# Patient Record
Sex: FEMALE | Race: BLACK OR AFRICAN AMERICAN | Employment: FULL TIME | URBAN - METROPOLITAN AREA
[De-identification: names, ages, dates, MRNs, and addresses within clinical notes are randomized per-mention and may not be internally consistent; named-entity substitution may affect disease eponyms.]

---

## 2017-03-15 DIAGNOSIS — I10 ESSENTIAL HYPERTENSION: ICD-10-CM

## 2017-03-15 RX ORDER — HYDROCHLOROTHIAZIDE 25 MG/1
TABLET ORAL
Qty: 90 TAB | Refills: 2 | OUTPATIENT
Start: 2017-03-15

## 2017-03-15 NOTE — TELEPHONE ENCOUNTER
Last seen 7/25/2016. Needs OV with me.      Denied:    Requested Prescriptions     Refused Prescriptions Disp Refills    hydroCHLOROthiazide (HYDRODIURIL) 25 mg tablet [Pharmacy Med Name: hydroCHLOROthiazide 25 MG TABLET] 90 Tab 2     Sig: TAKE ONE TABLET BY MOUTH DAILY     Refused By: Dennis Arteaga     Reason for Refusal: Appt required, please call patient

## 2017-03-16 ENCOUNTER — OFFICE VISIT (OUTPATIENT)
Dept: INTERNAL MEDICINE CLINIC | Age: 37
End: 2017-03-16

## 2017-03-16 VITALS
OXYGEN SATURATION: 99 % | HEART RATE: 85 BPM | WEIGHT: 216.2 LBS | TEMPERATURE: 97.1 F | DIASTOLIC BLOOD PRESSURE: 90 MMHG | HEIGHT: 67 IN | BODY MASS INDEX: 33.93 KG/M2 | SYSTOLIC BLOOD PRESSURE: 130 MMHG | RESPIRATION RATE: 16 BRPM

## 2017-03-16 DIAGNOSIS — F32.A ANXIETY AND DEPRESSION: ICD-10-CM

## 2017-03-16 DIAGNOSIS — E55.9 VITAMIN D DEFICIENCY: ICD-10-CM

## 2017-03-16 DIAGNOSIS — F41.9 ANXIETY AND DEPRESSION: ICD-10-CM

## 2017-03-16 DIAGNOSIS — I10 BENIGN HYPERTENSION WITHOUT CHF: Primary | ICD-10-CM

## 2017-03-16 RX ORDER — GLYCOPYRROLATE 1 MG/1
1 TABLET ORAL
COMMUNITY
Start: 2016-11-16

## 2017-03-16 RX ORDER — NORETHINDRONE ACETATE AND ETHINYL ESTRADIOL 1MG-20(21)
KIT ORAL
COMMUNITY
Start: 2017-02-27 | End: 2017-03-16

## 2017-03-16 RX ORDER — CITALOPRAM 20 MG/1
20 TABLET, FILM COATED ORAL DAILY
Qty: 30 TAB | Refills: 3 | Status: SHIPPED | OUTPATIENT
Start: 2017-03-16 | End: 2017-04-20 | Stop reason: DRUGHIGH

## 2017-03-16 RX ORDER — HYDROCHLOROTHIAZIDE 25 MG/1
25 TABLET ORAL DAILY
Qty: 90 TAB | Refills: 3 | Status: SHIPPED | OUTPATIENT
Start: 2017-03-16 | End: 2018-03-18 | Stop reason: SDUPTHER

## 2017-03-16 NOTE — PATIENT INSTRUCTIONS
1) if you did not take the lexapro today can start the celexa. Otherwise, start taking the celexa tomorrow. 2) follow-up in 1 month or sooner if worsening symptoms.

## 2017-03-16 NOTE — MR AVS SNAPSHOT
Visit Information Date & Time Provider Department Dept. Phone Encounter #  
 3/16/2017  7:15 AM Bre Gambino MD [address] [phone] [id number] Follow-up Instructions Return in about 1 month (around 4/16/2017) for f/u anxiety, f/u depression. Upcoming Health Maintenance Date Due  
 PAP AKA CERVICAL CYTOLOGY 10/10/2016 DTaP/Tdap/Td series (2 - Td) 6/26/2024 Allergies as of 3/16/2017  Review Complete On: 3/16/2017 By: Bernardo Arthur MD  
 No Known Allergies Current Immunizations  Reviewed on 7/25/2016 Name Date Tdap 6/26/2014 Not reviewed this visit You Were Diagnosed With   
  
 Codes Comments Benign hypertension without CHF    -  Primary ICD-10-CM: I10 
ICD-9-CM: 401.1 Vitamin D deficiency     ICD-10-CM: E55.9 ICD-9-CM: 268.9 Anxiety and depression     ICD-10-CM: F41.9, F32.9 ICD-9-CM: 300.00, 311 Vitals BP Pulse Temp Resp Height(growth percentile) Weight(growth percentile) (!) 139/98 (BP 1 Location: Right arm, BP Patient Position: Sitting) 85 97.1 °F (36.2 °C) (Oral) 16 5' 7\" (1.702 m) 216 lb 3.2 oz (98.1 kg) LMP SpO2 BMI OB Status Smoking Status 03/02/2017 99% 33.86 kg/m2 Having regular periods Former Smoker Vitals History BMI and BSA Data Body Mass Index Body Surface Area  
 33.86 kg/m 2 2.15 m 2 Preferred Pharmacy Pharmacy Name Phone [facility] [place], [address] [facility] [phone] Your Updated Medication List  
  
   
This list is accurate as of: 3/16/17  7:44 AM.  Always use your most recent med list.  
  
  
  
  
 citalopram 20 mg tablet Commonly known as:  Inga Letters Take 1 Tab by mouth daily. glycopyrrolate 1 mg tablet Commonly known as:  ROBINUL  
1 mg daily as needed. hydroCHLOROthiazide 25 mg tablet Commonly known as:  HYDRODIURIL Take 1 Tab by mouth daily. metroNIDAZOLE 500 mg tablet Commonly known as:  FLAGYL Take 500 mg by mouth.  
  
 phentermine 37.5 mg tablet Commonly known as:  ADIPEX-P Take 1 Tab by mouth every morning. Max Daily Amount: 37.5 mg.  
  
  
  
  
Prescriptions Sent to Pharmacy Refills  
 hydroCHLOROthiazide (HYDRODIURIL) 25 mg tablet 3 Sig: Take 1 Tab by mouth daily. Class: Normal  
 Pharmacy: [address] Ph #: [phone] Route: Oral  
 citalopram (CELEXA) 20 mg tablet 3 Sig: Take 1 Tab by mouth daily. Class: Normal  
 Pharmacy: [address] Ph #: [phone] Route: Oral  
  
Follow-up Instructions Return in about 1 month (around 4/16/2017) for f/u anxiety, f/u depression. To-Do List   
 04/16/2017 Lab:  CBC W/O DIFF   
  
 04/16/2017 Lab:  LIPID PANEL   
  
 04/16/2017 Lab:  METABOLIC PANEL, COMPREHENSIVE   
  
 04/16/2017 Lab:  URINALYSIS W/ RFLX MICROSCOPIC   
  
 04/16/2017 Lab:  VITAMIN D, 25 HYDROXY Patient Instructions 1) if you did not take the lexapro today can start the celexa. Otherwise, start taking the celexa tomorrow. 2) follow-up in 1 month or sooner if worsening symptoms. Introducing [facility]! [facility] introduces [employer] patient portal. Now you can access parts of your medical record, email your doctor's office, and request medication refills online. 1. In your internet browser, go to [email or web address] 2. Click on the First Time User? Click Here link in the Sign In box. You will see the New Member Sign Up page. 3. Enter your [employer] Access Code exactly as it appears below. You will not need to use this code after youve completed the sign-up process. If you do not sign up before the expiration date, you must request a new code. · Find[employer] Access Code: [email or web address] Expires: 6/14/2017  7:44 AM 
 
 4. Enter the last four digits of your Social Security Number (xxxx) and Date of Birth (mm/dd/yyyy) as indicated and click Submit. You will be taken to the next sign-up page. 5. Create a [employer] ID. This will be your [employer] login ID and cannot be changed, so think of one that is secure and easy to remember. 6. Create a [employer] password. You can change your password at any time. 7. Enter your Password Reset Question and Answer. This can be used at a later time if you forget your password. 8. Enter your e-mail address. You will receive e-mail notification when new information is available in [address] 9. Click Sign Up. You can now view and download portions of your medical record. 10. Click the Download Summary menu link to download a portable copy of your medical information. If you have questions, please visit the Frequently Asked Questions section of the [employer] website. Remember, [employer] is NOT to be used for urgent needs. For medical emergencies, dial 911. Now available from your iPhone and Android! Please provide this summary of care documentation to your next provider. Your primary care clinician is listed as Shavon Ovalle. If you have any questions after today's visit, please call [phone].

## 2017-03-16 NOTE — PROGRESS NOTES
ROOM # [id number] presents today for   Chief Complaint   Patient presents with    Medication Refill       Martha Dobbs preferred language for health care discussion is english/other. Is someone accompanying this pt? no    Is the patient using any DME equipment during OV? no    Depression Screening:  PHQ 2 / 9, over the last two weeks 3/16/2017 7/25/2016 7/25/2016 6/29/2016 2/8/2016 11/7/2014   Little interest or pleasure in doing things Several days Not at all Not at all Not at all Not at all Several days   Feeling down, depressed or hopeless Several days Not at all Not at all Not at all Not at all Several days   Total Score PHQ 2 2 0 0 0 0 2       Learning Assessment:  Learning Assessment 3/12/2015   PRIMARY LEARNER Patient   HIGHEST LEVEL OF EDUCATION - PRIMARY LEARNER  SOME COLLEGE   BARRIERS PRIMARY LEARNER NONE   PRIMARY LANGUAGE ENGLISH   LEARNER PREFERENCE PRIMARY DEMONSTRATION   ANSWERED BY Patient   RELATIONSHIP SELF       Abuse Screening:  No flowsheet data found. Fall Risk  No flowsheet data found. Health Maintenance reviewed and discussed per provider. Yes    Martha Dobbs is due for pap smear. Please order/place referral if appropriate. Advance Directive:  1. Do you have an advance directive in place? Patient Reply: no    2. If not, would you like material regarding how to put one in place? Patient Reply: no    Coordination of Care:  1. Have you been to the ER, urgent care clinic since your last visit? Hospitalized since your last visit? no    2. Have you seen or consulted any other health care providers outside of the [address] [place] [address] since your last visit? Include any pap smears or colon screening.  no
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
## 2017-03-21 ENCOUNTER — LAB ONLY (OUTPATIENT)
Dept: INTERNAL MEDICINE CLINIC | Age: 37
End: 2017-03-21

## 2017-03-22 LAB
25(OH)D3 SERPL-MCNC: 23.1 NG/ML (ref 32–100)
A-G RATIO,AGRAT: 1.5 RATIO (ref 1.1–2.6)
ALBUMIN SERPL-MCNC: 4.3 G/DL (ref 3.5–5)
ALP SERPL-CCNC: 68 U/L (ref 25–115)
ALT SERPL-CCNC: 11 U/L (ref 5–40)
ANION GAP SERPL CALC-SCNC: 18 MMOL/L
AST SERPL W P-5'-P-CCNC: 13 U/L (ref 10–37)
BILIRUB SERPL-MCNC: 0.4 MG/DL (ref 0.2–1.2)
BILIRUB UR QL: NEGATIVE
BUN SERPL-MCNC: 9 MG/DL (ref 6–22)
CALCIUM SERPL-MCNC: 9.3 MG/DL (ref 8.4–10.5)
CHLORIDE SERPL-SCNC: 97 MMOL/L (ref 98–110)
CHOLEST SERPL-MCNC: 176 MG/DL (ref 110–200)
CO2 SERPL-SCNC: 24 MMOL/L (ref 20–32)
CREAT SERPL-MCNC: 0.5 MG/DL (ref 0.5–1.2)
EPITHELIAL,EPSU: ABNORMAL /HPF (ref 0–2)
ERYTHROCYTE [DISTWIDTH] IN BLOOD BY AUTOMATED COUNT: 14.9 % (ref 10–16)
GFRAA, 66117: >60
GFRNA, 66118: >60
GLOBULIN,GLOB: 2.9 G/DL (ref 2–4)
GLUCOSE SERPL-MCNC: 102 MG/DL (ref 65–99)
GLUCOSE UR QL: NEGATIVE MG/DL
HCT VFR BLD AUTO: 36.5 % (ref 35.1–46.5)
HDLC SERPL-MCNC: 71 MG/DL (ref 40–59)
HGB BLD-MCNC: 11.1 G/DL (ref 11.7–15.5)
HGB UR QL STRIP: NEGATIVE
KETONES UR QL STRIP.AUTO: NEGATIVE MG/DL
LDLC SERPL CALC-MCNC: 70 MG/DL (ref 50–99)
LEUKOCYTE ESTERASE: ABNORMAL
MCH RBC QN AUTO: 28 PG (ref 26–34)
MCHC RBC AUTO-ENTMCNC: 30 G/DL (ref 32–36)
MCV RBC AUTO: 93 FL (ref 80–95)
NITRITE UR QL STRIP.AUTO: NEGATIVE
PH UR STRIP: 5 PH (ref 5–8)
PLATELET # BLD AUTO: 299 K/UL (ref 140–440)
PMV BLD AUTO: 11 FL (ref 6–10.8)
POTASSIUM SERPL-SCNC: 3.6 MMOL/L (ref 3.5–5.5)
PROT SERPL-MCNC: 7.2 G/DL (ref 6.4–8.3)
PROT UR QL STRIP: NEGATIVE MG/DL
RBC # BLD AUTO: 3.92 M/UL (ref 3.8–5.2)
RBC #/AREA URNS HPF: NEGATIVE /HPF
SODIUM SERPL-SCNC: 139 MMOL/L (ref 133–145)
SP GR UR: 1.02 (ref 1–1.03)
TRIGL SERPL-MCNC: 176 MG/DL (ref 40–149)
UROBILINOGEN UR STRIP-MCNC: <2 MG/DL
VLDLC SERPL CALC-MCNC: 35 MG/DL (ref 8–30)
WBC # BLD AUTO: 6.3 K/UL (ref 4–11)
WBC URNS QL MICRO: ABNORMAL /HPF (ref 0–2)

## 2017-03-24 NOTE — PROGRESS NOTES
Please let pt know that labs were normal except:    1) urine showed some WBC's, but also epithelial cells. So this could be a contaminate. Is she having any f/c/ns,. Dysuria, hematuria, increase urgency/frequency, abd pain, back pain? \    2) vitamin D low. She needs to take vitamin D3 2000 international units  Take one po daily OTC. 3) trig up at 176 and needs to be <150. She needs to work on diet and exercise and start fish oil 1000mg (DHA+EPA=1000mg) one po daily OTC. 4) glucose little up. Work on diet and exercise.

## 2017-03-27 ENCOUNTER — TELEPHONE (OUTPATIENT)
Dept: INTERNAL MEDICINE CLINIC | Age: 37
End: 2017-03-27

## 2017-03-27 NOTE — TELEPHONE ENCOUNTER
----- Message from Hanna Parra MD sent at 3/24/2017  3:03 PM EDT -----  She cannot take the fish oil at the same time as the celexa. See other result note below.

## 2017-03-27 NOTE — TELEPHONE ENCOUNTER
Unsuccessful attempt to reach pt for results. Left message for pt. To call back at her earliest convenience. Notes Recorded by Codie Lowery MD on 3/24/2017 at 2:42 PM  Please let pt know that labs were normal except:    1) urine showed some WBC's, but also epithelial cells. So this could be a contaminate. Is she having any f/c/ns,. Dysuria, hematuria, increase urgency/frequency, abd pain, back pain? \    2) vitamin D low. She needs to take vitamin D3 2000 international units  Take one po daily OTC. 3) trig up at 176 and needs to be <150. She needs to work on diet and exercise and start fish oil 1000mg (DHA+EPA=1000mg) one po daily OTC. 4) glucose little up.  Work on diet and exercise.

## 2017-03-27 NOTE — PROGRESS NOTES
The following result note has been reviewed. Please refer to telephone encounter created for further documentation.

## 2017-03-27 NOTE — TELEPHONE ENCOUNTER
----- Message from Winifred Mccollum MD sent at 3/24/2017  3:03 PM EDT -----  She cannot take the fish oil at the same time as the celexa. See other result note below.

## 2017-03-27 NOTE — LETTER
3/28/2017 9:18 AM 
 
Ms. Kelle Loera [address] [facility] [phone] Dear Kelle Loera: 
 
Please find your most recent results below. Resulted Orders METABOLIC PANEL, COMPREHENSIVE Result Value Ref Range Glucose 102 (H) 65 - 99 mg/dL BUN 9 6 - 22 mg/dL Creatinine 0.5 0.5 - 1.2 mg/dL Sodium 139 133 - 145 mmol/L Potassium 3.6 3.5 - 5.5 mmol/L Chloride 97 (L) 98 - 110 mmol/L  
 CO2 24 20 - 32 mmol/L  
 AST (SGOT) 13 10 - 37 U/L  
 ALT (SGPT) 11 5 - 40 U/L Alk. phosphatase 68 25 - 115 U/L Bilirubin, total 0.4 0.2 - 1.2 mg/dL Calcium 9.3 8.4 - 10.5 mg/dL Protein, total 7.2 6.4 - 8.3 g/dL Albumin 4.3 3.5 - 5.0 g/dL A-G Ratio 1.5 1.1 - 2.6 ratio Globulin 2.9 2.0 - 4.0 g/dL Anion gap 18.0 mmol/L  
 GFRAA >60.0 >60.0 GFRNA >60.0 >60.0 LIPID PANEL Result Value Ref Range Triglyceride 176 (H) 40 - 149 mg/dL HDL Cholesterol 71 (H) 40 - 59 mg/dL Cholesterol, total 176 110 - 200 mg/dL LDL, calculated 70 50 - 99 mg/dL VLDL, calculated 35 (H) 8 - 30 mg/dL CBC W/O DIFF Result Value Ref Range WBC 6.3 4.0 - 11.0 K/uL  
 RBC 3.92 3.80 - 5.20 M/uL  
 HGB 11.1 (L) 11.7 - 15.5 g/dL HCT 36.5 35.1 - 46.5 % MCV 93 80 - 95 fL  
 MCH 28 26 - 34 pg MCHC 30 (L) 32 - 36 g/dL  
 RDW 14.9 10.0 - 16.0 % PLATELET 686 654 - 199 K/uL MPV 11.0 (H) 6.0 - 10.8 fL  
VITAMIN D, 25 HYDROXY Result Value Ref Range VITAMIN D, 25-HYDROXY 23.1 (L) 32.0 - 100.0 ng/mL URINALYSIS W/MICROSCOPIC Result Value Ref Range Specific Gravity 1.021 1.005 - 1.03  
 pH (UA) 5.0 5.0 - 8.0 pH Protein Negative Negative, mg/dL Glucose Negative Negative mg/dL Ketone Negative Negative mg/dL Bilirubin Negative Negative Blood Negative Negative Nitrites Negative Negative Leukocyte Esterase Trace (A) Negative Urobilinogen <2.0 <2.0 mg/dL WBC 5-10 (A) 0 - 2 /hpf  
 RBC Negative Negative, /hpf  Epithelial cells 3-5 (A) 0 - 2 /hpf  
 
 
 
 RECOMMENDATIONS: 
Please let pt know that labs were normal except: 
 
1) urine showed some WBC's, but also epithelial cells. So this could be a contaminate. 2) vitamin D low. She needs to take vitamin D3 2000 international units  Take one po daily OTC. 3) trig up at 176 and needs to be <150. She needs to work on diet and exercise and start fish oil 1000mg (DHA+EPA=1000mg) one po daily OTC. 4) glucose little up. Work on diet and exercise.   
 
5)She cannot take the fish oil at the same time as the celexa. See other result note below. Please call me if you have any questions: [phone] Sincerely, Reese De Jesus M.D.

## 2017-03-28 ENCOUNTER — TELEPHONE (OUTPATIENT)
Dept: INTERNAL MEDICINE CLINIC | Age: 37
End: 2017-03-28

## 2017-03-28 NOTE — TELEPHONE ENCOUNTER
2nd attempted to contact pt at given/listed number, no answer. Lvm informing pt that a letter is being sent after multiple unsuccessful attempts to reach them. Once they have received the letter if they have any further questions or concerns to please feel free to contact our office at [phone].     Be advised

## 2017-04-03 NOTE — TELEPHONE ENCOUNTER
Incoming call from pt 2 pt identifiers confirmed. Informed pt of below she stated understanding no other questions or concerns noted at this time.

## 2017-04-20 ENCOUNTER — OFFICE VISIT (OUTPATIENT)
Dept: INTERNAL MEDICINE CLINIC | Age: 37
End: 2017-04-20

## 2017-04-20 VITALS
TEMPERATURE: 98.2 F | HEART RATE: 112 BPM | BODY MASS INDEX: 33.62 KG/M2 | RESPIRATION RATE: 16 BRPM | HEIGHT: 67 IN | WEIGHT: 214.2 LBS | DIASTOLIC BLOOD PRESSURE: 93 MMHG | SYSTOLIC BLOOD PRESSURE: 132 MMHG | OXYGEN SATURATION: 99 %

## 2017-04-20 DIAGNOSIS — F32.A ANXIETY AND DEPRESSION: Primary | ICD-10-CM

## 2017-04-20 DIAGNOSIS — R82.90 FOUL SMELLING URINE: ICD-10-CM

## 2017-04-20 DIAGNOSIS — N39.0 URINARY TRACT INFECTION WITHOUT HEMATURIA, SITE UNSPECIFIED: ICD-10-CM

## 2017-04-20 DIAGNOSIS — F41.9 ANXIETY AND DEPRESSION: Primary | ICD-10-CM

## 2017-04-20 LAB
BILIRUB UR QL STRIP: NEGATIVE
GLUCOSE UR-MCNC: NEGATIVE MG/DL
KETONES P FAST UR STRIP-MCNC: NEGATIVE MG/DL
PH UR STRIP: 7 [PH] (ref 4.6–8)
PROT UR QL STRIP: NORMAL MG/DL
SP GR UR STRIP: 1.02 (ref 1–1.03)
UA UROBILINOGEN AMB POC: NORMAL (ref 0.2–1)
URINALYSIS CLARITY POC: NORMAL
URINALYSIS COLOR POC: NORMAL
URINE BLOOD POC: NORMAL
URINE LEUKOCYTES POC: NEGATIVE
URINE NITRITES POC: POSITIVE

## 2017-04-20 RX ORDER — CITALOPRAM 40 MG/1
40 TABLET, FILM COATED ORAL DAILY
Qty: 30 TAB | Refills: 3 | Status: SHIPPED | OUTPATIENT
Start: 2017-04-20 | End: 2018-02-06

## 2017-04-20 RX ORDER — SULFAMETHOXAZOLE AND TRIMETHOPRIM 800; 160 MG/1; MG/1
1 TABLET ORAL 2 TIMES DAILY
Qty: 6 TAB | Refills: 0 | Status: SHIPPED | OUTPATIENT
Start: 2017-04-20 | End: 2017-04-23

## 2017-04-20 NOTE — LETTER
NOTIFICATION RETURN TO WORK / SCHOOL 
 
4/20/2017 3:42 PM 
 
Ms. Jelly Mclaughlin [address] [facility] [phone] To Whom It May Concern: 
 
Jelly Mclaughlin is currently under the care of Shayla De León. She will return to work on 4/21/17. If there are questions or concerns please have the patient contact our office. Sincerely, Esteban García MD

## 2017-04-20 NOTE — PROGRESS NOTES
ROOM # [address] presents today for   Chief Complaint   Patient presents with    Anxiety     medication change not helping poss causing depression    Bladder Infection     poss UTI last cx 3/21/17. no burning but strong odor and urinary frequency       Loree Saxena preferred language for health care discussion is english/other. Is someone accompanying this pt? no    Is the patient using any DME equipment during OV? no    Depression Screening:  PHQ 2 / 9, over the last two weeks 4/20/2017 3/16/2017 7/25/2016 7/25/2016 6/29/2016 2/8/2016 11/7/2014   Little interest or pleasure in doing things Several days Several days Not at all Not at all Not at all Not at all Several days   Feeling down, depressed or hopeless Several days Several days Not at all Not at all Not at all Not at all Several days   Total Score PHQ 2 2 2 0 0 0 0 2       Learning Assessment:  Learning Assessment 3/12/2015   PRIMARY LEARNER Patient   HIGHEST LEVEL OF EDUCATION - PRIMARY LEARNER  SOME COLLEGE   BARRIERS PRIMARY LEARNER NONE   PRIMARY LANGUAGE ENGLISH   LEARNER PREFERENCE PRIMARY DEMONSTRATION   ANSWERED BY Patient   RELATIONSHIP SELF       Abuse Screening:  No flowsheet data found. Fall Risk  No flowsheet data found. Health Maintenance reviewed and discussed per provider. Yes    Edward Betancourt is due for pap smear. Please order/place referral if appropriate. Advance Directive:  1. Do you have an advance directive in place? Patient Reply: no    2. If not, would you like material regarding how to put one in place? Patient Reply: no    Coordination of Care:  1. Have you been to the ER, urgent care clinic since your last visit? Hospitalized since your last visit? no    2. Have you seen or consulted any other health care providers outside of the [address] [place] [address] since your last visit? Include any pap smears or colon screening.  no

## 2017-04-20 NOTE — PATIENT INSTRUCTIONS
1) increase the celexa to 40mg daily (take 20mg 2 po daily)    2) increase water    3) follow-up in 1 month or sooner if worsening symptoms. 4) sent new rx for celexa 40mg one po daily. Urinary Tract Infection in Women: Care Instructions  Your Care Instructions    A urinary tract infection, or UTI, is a general term for an infection anywhere between the kidneys and the urethra (where urine comes out). Most UTIs are bladder infections. They often cause pain or burning when you urinate. UTIs are caused by bacteria and can be cured with antibiotics. Be sure to complete your treatment so that the infection goes away. Follow-up care is a key part of your treatment and safety. Be sure to make and go to all appointments, and call your doctor if you are having problems. It's also a good idea to know your test results and keep a list of the medicines you take. How can you care for yourself at home? · Take your antibiotics as directed. Do not stop taking them just because you feel better. You need to take the full course of antibiotics. · Drink extra water and other fluids for the next day or two. This may help wash out the bacteria that are causing the infection. (If you have kidney, heart, or liver disease and have to limit fluids, talk with your doctor before you increase your fluid intake.)  · Avoid drinks that are carbonated or have caffeine. They can irritate the bladder. · Urinate often. Try to empty your bladder each time. · To relieve pain, take a hot bath or lay a heating pad set on low over your lower belly or genital area. Never go to sleep with a heating pad in place. To prevent UTIs  · Drink plenty of water each day. This helps you urinate often, which clears bacteria from your system. (If you have kidney, heart, or liver disease and have to limit fluids, talk with your doctor before you increase your fluid intake.)  · Urinate when you need to. · Urinate right after you have sex.   · Change sanitary pads often. · Avoid douches, bubble baths, feminine hygiene sprays, and other feminine hygiene products that have deodorants. · After going to the bathroom, wipe from front to back. When should you call for help? Call your doctor now or seek immediate medical care if:  · Symptoms such as fever, chills, nausea, or vomiting get worse or appear for the first time. · You have new pain in your back just below your rib cage. This is called flank pain. · There is new blood or pus in your urine. · You have any problems with your antibiotic medicine. Watch closely for changes in your health, and be sure to contact your doctor if:  · You are not getting better after taking an antibiotic for 2 days. · Your symptoms go away but then come back. Where can you learn more? Go to http://jacky-mayra.info/. Enter M731 in the search box to learn more about \"Urinary Tract Infection in Women: Care Instructions. \"  Current as of: November 28, 2016  Content Version: 11.2  © 6886-6707 [employer]. Care instructions adapted under license by [employer] (which disclaims liability or warranty for this information). If you have questions about a medical condition or this instruction, always ask your healthcare professional. [address] any warranty or liability for your use of this information.
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
## 2017-04-22 LAB — CULTURE RESULT, SENTARA: ABNORMAL

## 2017-04-24 ENCOUNTER — TELEPHONE (OUTPATIENT)
Dept: INTERNAL MEDICINE CLINIC | Age: 37
End: 2017-04-24

## 2017-04-24 DIAGNOSIS — N39.0 CHRONIC UTI: Primary | ICD-10-CM

## 2017-04-24 DIAGNOSIS — N39.0 URINARY TRACT INFECTION WITHOUT HEMATURIA, SITE UNSPECIFIED: Primary | ICD-10-CM

## 2017-04-24 RX ORDER — CEFUROXIME AXETIL 500 MG/1
500 TABLET ORAL 2 TIMES DAILY
Qty: 20 TAB | Refills: 0 | Status: SHIPPED | OUTPATIENT
Start: 2017-04-24 | End: 2017-05-04

## 2017-04-24 NOTE — TELEPHONE ENCOUNTER
Referral generated for urology.  The closet urologist that could find is in [address] [place] [address].

## 2017-04-24 NOTE — TELEPHONE ENCOUNTER
2 pt. Identifiers confirmed. Pt. Notified of below. Pt. States she has had recurrent UTI's including one 6 mos ago and one before that. Per Dr. Kern Speaker will refer to Urology. Pt. In agreement c referral to [facility]. No other questions/concerns at this time.

## 2017-04-24 NOTE — TELEPHONE ENCOUNTER
Called pt. To  notify of below. She states that she would rather go to [facility] if there is not closer alternative. No other questions/concerns at this time.

## 2017-04-24 NOTE — TELEPHONE ENCOUNTER
Unsuccessful attempt to reach pt for results. Left message for pt. To call back at her earliest convenience.

## 2017-04-24 NOTE — PROGRESS NOTES
1) Please let pt know that urine ctx positive for E. Coli, although it is resistant to bactrim. 2) Will send electronically: cerfuroxime 500mg one po bid x 10 days #20 no refills. The bacteria is susceptible to this.

## 2017-04-24 NOTE — TELEPHONE ENCOUNTER
----- Message from Jennifre Phan MD sent at 4/24/2017  7:57 AM EDT -----  1) Please let pt know that urine ctx positive for E. Coli, although it is resistant to bactrim. 2) Will send electronically: cerfuroxime 500mg one po bid x 10 days #20 no refills. The bacteria is susceptible to this.

## 2017-04-25 NOTE — TELEPHONE ENCOUNTER
2 pt. Identifiers confirmed. Pt. States she will need the referral for insurance purposes, but she will call and make her own aptmt. No other questions/concerns at this time.

## 2017-04-25 NOTE — TELEPHONE ENCOUNTER
If she wants at urology [facility]. Do I need to do a referral and will she make her own appt?

## 2017-05-03 DIAGNOSIS — N39.0 URINARY TRACT INFECTION WITHOUT HEMATURIA, SITE UNSPECIFIED: Primary | ICD-10-CM

## 2017-05-03 RX ORDER — NITROFURANTOIN 25; 75 MG/1; MG/1
100 CAPSULE ORAL 2 TIMES DAILY
Qty: 14 CAP | Refills: 0 | Status: SHIPPED | OUTPATIENT
Start: 2017-05-03 | End: 2017-05-23 | Stop reason: ALTCHOICE

## 2017-05-03 NOTE — TELEPHONE ENCOUNTER
Sent electronically:    Requested Prescriptions     Signed Prescriptions Disp Refills    nitrofurantoin, macrocrystal-monohydrate, (MACROBID) 100 mg capsule 14 Cap 0     Sig: Take 1 Cap by mouth two (2) times a day. Authorizing Provider: Tim Lindsey     Order in [facility] for urine and urine ctx.

## 2017-05-03 NOTE — TELEPHONE ENCOUNTER
Has she been taking the cefuroxime with food? Also, if she got ceftriaxone she would need to get every day for at least 3-7 days. The ceftriaxone is in the same family as the cefuroxime. The other antibiotics this is susceptible to are IV ones. There is one oral antibiotic that has intermediate susceptibility. Meaning it may or may not treat it. This is macrobid 100mg one po bid x 7 day. We might be able to do this and check urine/urine ctx after treatment to make sure cleared.

## 2017-05-03 NOTE — TELEPHONE ENCOUNTER
Patient called in verified with two identifiers, she stated that when she started her Ceftin on 4/25/17 she took the first two tablets and it gave her an upset stomach so then she just took 1 tablet daily till yesterday (5/2/17) she decided to take the two tablets as directed and she ended up with diarrhea and vomiting. She wanted to know is there something else to take or can she just get the one time shot? Advised that I would talk with provider and get back with her patient verbalized understanding.

## 2017-05-03 NOTE — TELEPHONE ENCOUNTER
Called patient and verified with two identifiers to find out if she has been taking her ceftin with food and she has been. Advised that per Dr. Edith Marie the only other treatments would be:  Also, if she got ceftriaxone she would need to get every day for at least 3-7 days. The ceftriaxone is in the same family as the cefuroxime.      The other antibiotics this is susceptible to are IV ones. There is one oral antibiotic that has intermediate susceptibility. Meaning it may or may not treat it. This is macrobid 100mg one po bid x 7 day. We might be able to do this and check urine/urien ctx after treatment to make sure cleared. Patient stated she would go for the [facility] and come back in 7 days to be checked again that she will call and make that appointment tomorrow. Advised that I will forward this to Dr. Edith Marie to send the Macrobid to her pharmacy and she can pick it up later on this evening. Patient verbalized understanding.

## 2017-05-09 ENCOUNTER — OFFICE VISIT (OUTPATIENT)
Dept: INTERNAL MEDICINE CLINIC | Age: 37
End: 2017-05-09

## 2017-05-09 VITALS
WEIGHT: 212 LBS | HEART RATE: 100 BPM | TEMPERATURE: 97.2 F | BODY MASS INDEX: 33.27 KG/M2 | RESPIRATION RATE: 16 BRPM | SYSTOLIC BLOOD PRESSURE: 160 MMHG | DIASTOLIC BLOOD PRESSURE: 80 MMHG | HEIGHT: 67 IN | OXYGEN SATURATION: 98 %

## 2017-05-09 DIAGNOSIS — I10 BENIGN HYPERTENSION WITHOUT CHF: Primary | ICD-10-CM

## 2017-05-09 DIAGNOSIS — F41.9 ANXIETY: ICD-10-CM

## 2017-05-09 DIAGNOSIS — R82.90 FOUL SMELLING URINE: ICD-10-CM

## 2017-05-09 RX ORDER — AMLODIPINE BESYLATE 5 MG/1
5 TABLET ORAL DAILY
Qty: 30 TAB | Refills: 3 | Status: SHIPPED | OUTPATIENT
Start: 2017-05-09 | End: 2018-07-05 | Stop reason: SDUPTHER
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
## 2017-05-09 NOTE — MR AVS SNAPSHOT
Visit Information Date & Time Provider Department Dept. Phone Encounter #  
 5/9/2017  3:30 PM Reeda Goldberg Cloyce Silvers, MD [facility] [phone] [id number] Follow-up Instructions Return in about 2 weeks (around 5/23/2017) for f/u HTN. Your Appointments 5/22/2017  3:30 PM  
Office Visit with Walt Self MD  
[id number]) Appt Note: 1 month f/u depression [address]  
  
   
 [id number] [place] [address]  
  
    
  
 6/13/2017 11:15 AM  
Any with Octavio Adame MD  
[facility] [place] [id number]) Appt Note: NP Rec UTI  ref by Kirsten Hui [phone]  
 [address] [place] [address]  
[phone]  
  
   
 [place] [id number] Upcoming Health Maintenance Date Due  
 PAP AKA CERVICAL CYTOLOGY 10/10/2016 INFLUENZA AGE 9 TO ADULT 8/1/2017 DTaP/Tdap/Td series (2 - Td) 6/26/2024 Allergies as of 5/9/2017  Review Complete On: 5/9/2017 By: Walt Self MD  
  
 Severity Noted Reaction Type Reactions Ceftin [Cefuroxime Axetil]  05/09/2017    Nausea and Vomiting Current Immunizations  Reviewed on 7/25/2016 Name Date Tdap 6/26/2014 Not reviewed this visit You Were Diagnosed With   
  
 Codes Comments Benign hypertension without CHF    -  Primary ICD-10-CM: I10 
ICD-9-CM: 401.1 Anxiety     ICD-10-CM: F41.9 ICD-9-CM: 300.00 Foul smelling urine     ICD-10-CM: R82.90 ICD-9-CM: 791.9 Vitals BP Pulse Temp Resp Height(growth percentile) Weight(growth percentile) (!) 162/109 (BP 1 Location: Right arm, BP Patient Position: Sitting) 100 97.2 °F (36.2 °C) (Oral) 16 5' 7\" (1.702 m) 212 lb (96.2 kg) LMP SpO2 BMI OB Status Smoking Status 04/06/2017 98% 33.2 kg/m2 Having regular periods Former Smoker Vitals History BMI and BSA Data Body Mass Index Body Surface Area  
 33.2 kg/m 2 2.13 m 2 Preferred Pharmacy Pharmacy Name Phone [facility], [address] [address] [facility] [address] [place] [address] [phone] Your Updated Medication List  
  
   
This list is accurate as of: 5/9/17  4:00 PM.  Always use your most recent med list. amLODIPine 5 mg tablet Commonly known as:  Houston Arnt Take 1 Tab by mouth daily. citalopram 40 mg tablet Commonly known as:  Nevelyn Poke Take 1 Tab by mouth daily. glycopyrrolate 1 mg tablet Commonly known as:  ROBINUL  
1 mg daily as needed. hydroCHLOROthiazide 25 mg tablet Commonly known as:  HYDRODIURIL Take 1 Tab by mouth daily. nitrofurantoin (macrocrystal-monohydrate) 100 mg capsule Commonly known as:  MACROBID Take 1 Cap by mouth two (2) times a day. phentermine 37.5 mg tablet Commonly known as:  ADIPEX-P Take 1 Tab by mouth every morning. Max Daily Amount: 37.5 mg.  
  
  
  
  
Prescriptions Sent to Pharmacy Refills  
 amLODIPine (NORVASC) 5 mg tablet 3 Sig: Take 1 Tab by mouth daily. Class: Normal  
 Pharmacy: [facility], [address] [facility] #: [phone] Route: Oral  
  
Follow-up Instructions Return in about 2 weeks (around 5/23/2017) for f/u HTN. To-Do List   
 05/09/2017 Microbiology:  CULTURE, URINE   
  
 05/09/2017 Lab:  URINALYSIS W/ RFLX MICROSCOPIC Patient Instructions 1) follow-up in 2 weeks or sooner if worsening symptoms. 2) keep an eye on your blood pressure and let us know if too high or too low. Introducing [facility]! [address] introduces [email or web address] patient portal. Now you can access parts of your medical record, email your doctor's office, and request medication refills online. 1. In your internet browser, go to [email or web address] 2. Click on the First Time User? Click Here link in the Sign In box. You will see the New Member Sign Up page. 3. Enter your TapInko Access Code exactly as it appears below. You will not need to use this code after youve completed the sign-up process. If you do not sign up before the expiration date, you must request a new code. · TapInko Access Code: [email or web address] Expires: 6/14/2017  7:44 AM 
 
4. Enter the last four digits of your Social Security Number (xxxx) and Date of Birth (mm/dd/yyyy) as indicated and click Submit. You will be taken to the next sign-up page. 5. Create a TapInko ID. This will be your TapInko login ID and cannot be changed, so think of one that is secure and easy to remember. 6. Create a TapInko password. You can change your password at any time. 7. Enter your Password Reset Question and Answer. This can be used at a later time if you forget your password. 8. Enter your e-mail address. You will receive e-mail notification when new information is available in [address] 9. Click Sign Up. You can now view and download portions of your medical record. 10. Click the Download Summary menu link to download a portable copy of your medical information. If you have questions, please visit the Frequently Asked Questions section of the TapInko website. Remember, TapInko is NOT to be used for urgent needs. For medical emergencies, dial 911. Now available from your iPhone and Android! Please provide this summary of care documentation to your next provider. Your primary care clinician is listed as Bravo Owen. If you have any questions after today's visit, please call [phone].

## 2017-05-09 NOTE — LETTER
5/9/2017 3:58 PM 
 
Ms. Sakina Michel [address] [facility] [address] [id number] To Whom It May Concern: This is a letter stating that Ms. Juan Reid did in fact call us regarding her medical issues on 5/3/17. If you have any questions, please have the patient contact us at [phone]. Sincerely, Yann Wesley M.D.

## 2017-05-09 NOTE — PATIENT INSTRUCTIONS
1) follow-up in 2 weeks or sooner if worsening symptoms. 2) keep an eye on your blood pressure and let us know if too high or too low.

## 2017-05-09 NOTE — PROGRESS NOTES
ROOM # 2    Leeann Frye presents today for   Chief Complaint   Patient presents with    Nausea    Vaginal Bleeding     spotting for one day only       Leeann Frye preferred language for health care discussion is english/other. Is someone accompanying this pt? no    Is the patient using any DME equipment during OV? no    Depression Screening:  PHQ over the last two weeks 5/9/2017 4/20/2017 3/16/2017 7/25/2016 7/25/2016 6/29/2016 2/8/2016   Little interest or pleasure in doing things Not at all Several days Several days Not at all Not at all Not at all Not at all   Feeling down, depressed or hopeless Not at all Several days Several days Not at all Not at all Not at all Not at all   Total Score PHQ 2 0 2 2 0 0 0 0       Learning Assessment:  Learning Assessment 3/12/2015   PRIMARY LEARNER Patient   HIGHEST LEVEL OF EDUCATION - PRIMARY LEARNER  SOME COLLEGE   BARRIERS PRIMARY LEARNER NONE   PRIMARY LANGUAGE ENGLISH   LEARNER PREFERENCE PRIMARY DEMONSTRATION   ANSWERED BY Patient   RELATIONSHIP SELF       Abuse Screening:  No flowsheet data found. Fall Risk  No flowsheet data found. Health Maintenance reviewed and discussed per provider. Yes    Leeann Frye is due for pap smear. Please order/place referral if appropriate. Advance Directive:  1. Do you have an advance directive in place? Patient Reply: no    2. If not, would you like material regarding how to put one in place? Patient Reply: no    Coordination of Care:  1. Have you been to the ER, urgent care clinic since your last visit? Hospitalized since your last visit? no    2. Have you seen or consulted any other health care providers outside of the [facility] since your last visit? Include any pap smears or colon screening.  no

## 2017-05-09 NOTE — LETTER
NOTIFICATION RETURN TO WORK / SCHOOL 
 
5/9/2017 3:57 PM 
 
Ms. Dallas Cartwright [address] [facility] [address] [id number] To Whom It May Concern: 
 
Dallas Cartwright is currently under the care of Shayla De León. She will return to work on 5/10/17. If there are questions or concerns please have the patient contact our office. Sincerely, Tessie Almeida MD

## 2017-05-10 ENCOUNTER — TELEPHONE (OUTPATIENT)
Dept: INTERNAL MEDICINE CLINIC | Age: 37
End: 2017-05-10

## 2017-05-10 NOTE — TELEPHONE ENCOUNTER
Pt contacted at home number. 2 pt identifiers confirmed. Pt states she faxed over information this morning regarding a previous appointment. Pt states she did received a letter on yesterday but her job is requiring more information. Pt is asking that that form be filled out and faxed back at your earliest convenience. Dr Saravanan Mac please be advised.

## 2017-05-11 LAB
BILIRUB UR QL: NEGATIVE
CULTURE RESULT, SENTARA: ABNORMAL
EPITHELIAL,EPSU: ABNORMAL /HPF (ref 0–2)
GLUCOSE UR QL: NEGATIVE MG/DL
HGB UR QL STRIP: ABNORMAL
KETONES UR QL STRIP.AUTO: ABNORMAL MG/DL
LEUKOCYTE ESTERASE: ABNORMAL
NITRITE UR QL STRIP.AUTO: POSITIVE
PH UR STRIP: 6 PH (ref 5–8)
PROT UR QL STRIP: NEGATIVE MG/DL
RBC #/AREA URNS HPF: ABNORMAL /HPF
SP GR UR: 1.02 (ref 1–1.03)
UROBILINOGEN UR STRIP-MCNC: <2 MG/DL
WBC URNS QL MICRO: ABNORMAL /HPF (ref 0–2)

## 2017-05-12 NOTE — PROGRESS NOTES
1) Please let pt know that urine did show UTI and resistant to so many antibiotics including the macrobid. 2) The only option is since she could not tolerate the cefuroxime, but maybe she can tolerate oral keflex. Is she willing to try the keflex? 3) Otherwise the other option is IM rocephin.

## 2017-05-16 ENCOUNTER — TELEPHONE (OUTPATIENT)
Dept: INTERNAL MEDICINE CLINIC | Age: 37
End: 2017-05-16

## 2017-05-16 NOTE — TELEPHONE ENCOUNTER
----- Message from Anya Mancini MD sent at 5/12/2017  3:36 PM EDT -----  See message below. Oral keflex is in the same family as the cefuroxime.

## 2017-05-16 NOTE — TELEPHONE ENCOUNTER
2 pt. Identifiers confirmed. Pt. Notified of below. She states she would rather have the IM injection. She plans to come in on Thursday (nurse visit), but would require an excuse note for work. No other questions/concerns at this time. See message below. Oral keflex is in the same family as the cefuroxime. 1) Please let pt know that urine did show UTI and resistant to so many antibiotics including the macrobid. 2) The only option is since she could not tolerate the cefuroxime, but maybe she can tolerate oral keflex. Is she willing to try the keflex?     3) Otherwise the other option is IM rocephin

## 2017-05-18 ENCOUNTER — CLINICAL SUPPORT (OUTPATIENT)
Dept: INTERNAL MEDICINE CLINIC | Age: 37
End: 2017-05-18

## 2017-05-18 DIAGNOSIS — N39.0 URINARY TRACT INFECTION WITHOUT HEMATURIA, SITE UNSPECIFIED: Primary | ICD-10-CM

## 2017-05-18 RX ORDER — CEFTRIAXONE 1 G/1
1 INJECTION, POWDER, FOR SOLUTION INTRAMUSCULAR; INTRAVENOUS ONCE
Qty: 1 VIAL | Refills: 0
Start: 2017-05-18 | End: 2017-05-18

## 2017-05-18 NOTE — LETTER
NOTIFICATION RETURN TO WORK / SCHOOL 
 
5/18/2017 5:24 PM 
 
Ms. Marilee Davenport [address] [facility] [address] [phone] To Whom It May Concern: 
 
Marilee Davenport is currently under the care of Shayla De León. She will return to work on 5/19/17. Patient was seen in the office today, 5/18/17. If there are questions or concerns please have the patient contact our office. Sincerely, Ramiro Gracia MD

## 2017-05-18 NOTE — PROGRESS NOTES
After obtaining consent, and per orders of Dr. Ar Prescott, injection of 1g Rocefin given by Chau Branch LPN. Patient instructed to remain in clinic for 20 minutes afterwards, and to report any adverse reaction to me immediately. No adverse reactions noted, pt tolerated well.

## 2017-05-23 ENCOUNTER — OFFICE VISIT (OUTPATIENT)
Dept: INTERNAL MEDICINE CLINIC | Age: 37
End: 2017-05-23

## 2017-05-23 VITALS
WEIGHT: 214.6 LBS | SYSTOLIC BLOOD PRESSURE: 122 MMHG | HEIGHT: 67 IN | BODY MASS INDEX: 33.68 KG/M2 | RESPIRATION RATE: 16 BRPM | TEMPERATURE: 97.7 F | OXYGEN SATURATION: 99 % | HEART RATE: 89 BPM | DIASTOLIC BLOOD PRESSURE: 80 MMHG

## 2017-05-23 DIAGNOSIS — N39.0 URINARY TRACT INFECTION WITHOUT HEMATURIA, SITE UNSPECIFIED: ICD-10-CM

## 2017-05-23 DIAGNOSIS — N92.6 ABNORMAL MENSES: ICD-10-CM

## 2017-05-23 DIAGNOSIS — F32.A DEPRESSION, UNSPECIFIED DEPRESSION TYPE: Primary | ICD-10-CM
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
## 2017-05-23 NOTE — MR AVS SNAPSHOT
Visit Information Date & Time Provider Department Dept. Phone Encounter #  
 5/23/2017  3:45 PM Roland Mcqueen MD [employer] [phone] [id number] Follow-up Instructions Return in about 3 months (around 8/23/2017) for f/u HTN, f/u depression. 6/13/2017 11:15 AM  
Any with Rachana Laboy MD  
[facility] [place]t Note: NP Rec UTI  ref by [employer] [phone]  
 [address] [place] [address]  
[phone]  
  
   
 [place] [id number] Upcoming Health Maintenance Date Due  
 PAP AKA CERVICAL CYTOLOGY 10/10/2016 INFLUENZA AGE 9 TO ADULT 8/1/2017 DTaP/Tdap/Td series (2 - Td) 6/26/2024 Allergies as of 5/23/2017  Review Complete On: 5/23/2017 By: Christopher Pineda MD  
  
 Severity Noted Reaction Type Reactions Ceftin [Cefuroxime Axetil]  05/09/2017    Nausea and Vomiting She can tolerate ceftriaxone. Current Immunizations  Reviewed on 7/25/2016 Name Date Tdap 6/26/2014 Not reviewed this visit You Were Diagnosed With   
  
 Codes Comments Depression, unspecified depression type    -  Primary ICD-10-CM: F32.9 ICD-9-CM: 057 Urinary tract infection without hematuria, site unspecified     ICD-10-CM: N39.0 ICD-9-CM: 599.0 Abnormal menses     ICD-10-CM: N92.6 ICD-9-CM: 626.9 Vitals BP Pulse Temp Resp Height(growth percentile) Weight(growth percentile) 122/80 (BP 1 Location: Left arm, BP Patient Position: Sitting) 89 97.7 °F (36.5 °C) (Oral) 16 5' 7\" (1.702 m) 214 lb 9.6 oz (97.3 kg) LMP SpO2 BMI OB Status Smoking Status 04/25/2017 99% 33.61 kg/m2 Having regular periods Former Smoker Vitals History BMI and BSA Data Body Mass Index Body Surface Area  
 33.61 kg/m 2 2.14 m 2 [facility] [place] [address] [facility] [address] [place] [address] [phone] Your Updated Medication List  
  
   
This list is accurate as of: 5/23/17  4:37 PM.  Always use your most recent med list. amLODIPine 5 mg tablet Commonly known as:  Sameamber Mina Take 1 Tab by mouth daily. citalopram 40 mg tablet Commonly known as:  Oz Loop Take 1 Tab by mouth daily. glycopyrrolate 1 mg tablet Commonly known as:  ROBINUL  
1 mg daily as needed. hydroCHLOROthiazide 25 mg tablet Commonly known as:  HYDRODIURIL Take 1 Tab by mouth daily. OTHER Indications: unspecified birth control pill.  
  
 phentermine 37.5 mg tablet Commonly known as:  ADIPEX-P Take 1 Tab by mouth every morning. Max Daily Amount: 37.5 mg. Follow-up Instructions Return in about 3 months (around 8/23/2017) for f/u HTN, f/u depression. To-Do List   
 05/23/2017 Microbiology:  CULTURE, URINE   
  
 05/23/2017 Lab:  URINALYSIS W/ RFLX MICROSCOPIC Patient Instructions 1) follow-up in 3 months or sooner if worsening symptoms. Introducing [facility] & HEALTH SERVICES! [facility] introduces [employer] patient portal. Now you can access parts of your medical record, email your doctor's office, and request medication refills online. 1. In your internet browser, go to [email or web address] 2. Click on the First Time User? Click Here link in the Sign In box. You will see the New Member Sign Up page. 3. Enter your [employer] Access Code exactly as it appears below. You will not need to use this code after youve completed the sign-up process. If you do not sign up before the expiration date, you must request a new code. · [employer] Access Code: [email or web address] Expires: 6/14/2017  7:44 AM 
 
4. Enter the last four digits of your Social Security Number (xxxx) and Date of Birth (mm/dd/yyyy) as indicated and click Submit.  You will be taken to the next sign-up page. 5. Create a [employer] ID. This will be your [employer] login ID and cannot be changed, so think of one that is secure and easy to remember. 6. Create a [employer] password. You can change your password at any time. 7. Enter your Password Reset Question and Answer. This can be used at a later time if you forget your password. 8. Enter your e-mail address. You will receive e-mail notification when new information is available in [address] 9. Click Sign Up. You can now view and download portions of your medical record. 10. Click the Download Summary menu link to download a portable copy of your medical information. If you have questions, please visit the Frequently Asked Questions section of the [employer] website. Remember, [employer] is NOT to be used for urgent needs. For medical emergencies, dial 911. Now available from your iPhone and Android! Please provide this summary of care documentation to your next provider. Your primary care clinician is listed as Tigist Bowser. If you have any questions after today's visit, please call [phone].

## 2017-05-23 NOTE — PROGRESS NOTES
ROOM # 3    Yanira Field presents today for   Chief Complaint   Patient presents with    Depression       Yanira Field preferred language for health care discussion is english/other. Is someone accompanying this pt? no    Is the patient using any DME equipment during OV? no    Depression Screening:  PHQ over the last two weeks 5/23/2017 5/9/2017 4/20/2017 3/16/2017 7/25/2016 7/25/2016 6/29/2016   Little interest or pleasure in doing things Several days Not at all Several days Several days Not at all Not at all Not at all   Feeling down, depressed or hopeless Several days Not at all Several days Several days Not at all Not at all Not at all   Total Score PHQ 2 2 0 2 2 0 0 0       Learning Assessment:  Learning Assessment 3/12/2015   PRIMARY LEARNER Patient   HIGHEST LEVEL OF EDUCATION - PRIMARY LEARNER  SOME COLLEGE   BARRIERS PRIMARY LEARNER NONE   PRIMARY LANGUAGE ENGLISH   LEARNER PREFERENCE PRIMARY DEMONSTRATION   ANSWERED BY Patient   RELATIONSHIP SELF       Abuse Screening:  No flowsheet data found. Fall Risk  No flowsheet data found. Health Maintenance reviewed and discussed per provider. Yes    Yanira Field is due for pap smear. Please order/place referral if appropriate. Advance Directive:  1. Do you have an advance directive in place? Patient Reply: no    2. If not, would you like material regarding how to put one in place? Patient Reply: no    Coordination of Care:  1. Have you been to the ER, urgent care clinic since your last visit? Hospitalized since your last visit? no    2. Have you seen or consulted any other health care providers outside of the [address] [place] [address] since your last visit? Include any pap smears or colon screening.  no

## 2017-05-23 NOTE — LETTER
NOTIFICATION RETURN TO WORK / SCHOOL 
 
5/23/2017 4:36 PM 
 
Ms. Lee Ang [address] [facility] [address] [phone] To Whom It May Concern: 
 
Lee Ang is currently under the care of Shayla De León. She will return to work on 5/24/17 and she was seen in our office today, 5/23/17. If there are questions or concerns please have the patient contact our office. Sincerely, Parth Hinds MD

## 2017-05-25 LAB
BILIRUB UR QL: NEGATIVE
CULTURE RESULT, SENTARA: NORMAL
EPITHELIAL,EPSU: ABNORMAL /HPF (ref 0–2)
GLUCOSE UR QL: NEGATIVE MG/DL
HGB UR QL STRIP: NEGATIVE
KETONES UR QL STRIP.AUTO: ABNORMAL MG/DL
LEUKOCYTE ESTERASE: NEGATIVE
NITRITE UR QL STRIP.AUTO: NEGATIVE
PH UR STRIP: 5 PH (ref 5–8)
PROT UR QL STRIP: ABNORMAL MG/DL
RBC #/AREA URNS HPF: ABNORMAL /HPF
SP GR UR: 1.03 (ref 1–1.03)
UROBILINOGEN UR STRIP-MCNC: 2 MG/DL
WBC URNS QL MICRO: ABNORMAL /HPF (ref 0–2)

## 2017-06-14 PROBLEM — N30.90 BLADDER INFECTION: Status: ACTIVE | Noted: 2017-06-14

## 2017-07-08 PROBLEM — N39.0 RECURRENT UTI: Status: ACTIVE | Noted: 2017-07-08

## 2017-08-28 ENCOUNTER — OFFICE VISIT (OUTPATIENT)
Dept: INTERNAL MEDICINE CLINIC | Age: 37
End: 2017-08-28

## 2017-08-28 VITALS
WEIGHT: 212 LBS | SYSTOLIC BLOOD PRESSURE: 148 MMHG | DIASTOLIC BLOOD PRESSURE: 96 MMHG | TEMPERATURE: 97.4 F | OXYGEN SATURATION: 100 % | HEART RATE: 82 BPM | HEIGHT: 67 IN | BODY MASS INDEX: 33.27 KG/M2 | RESPIRATION RATE: 18 BRPM

## 2017-08-28 DIAGNOSIS — I10 ESSENTIAL HYPERTENSION: Primary | ICD-10-CM

## 2017-08-28 RX ORDER — BUPROPION HYDROCHLORIDE 150 MG/1
150 TABLET, EXTENDED RELEASE ORAL 2 TIMES DAILY
COMMUNITY
End: 2019-12-09 | Stop reason: SDUPTHER

## 2017-08-28 RX ORDER — AMLODIPINE BESYLATE 2.5 MG/1
2.5 TABLET ORAL DAILY
Qty: 30 TAB | Refills: 2 | Status: SHIPPED | OUTPATIENT
Start: 2017-08-28 | End: 2017-11-26

## 2017-08-28 NOTE — PROGRESS NOTES
ROOM # 9    Katia Desai presents today for   Chief Complaint   Patient presents with    Hypertension     pt present to clinic today for follow-up for her blood pressure       Loree Garcia preferred language for health care discussion is english/other. Is someone accompanying this pt? no    Is the patient using any DME equipment during OV? no    Depression Screening:  PHQ over the last two weeks 5/23/2017 5/9/2017 4/20/2017 3/16/2017 7/25/2016 7/25/2016 6/29/2016   Little interest or pleasure in doing things Several days Not at all Several days Several days Not at all Not at all Not at all   Feeling down, depressed or hopeless Several days Not at all Several days Several days Not at all Not at all Not at all   Total Score PHQ 2 2 0 2 2 0 0 0       Learning Assessment:  Learning Assessment 3/12/2015   PRIMARY LEARNER Patient   HIGHEST LEVEL OF EDUCATION - PRIMARY LEARNER  SOME COLLEGE   BARRIERS PRIMARY LEARNER NONE   PRIMARY LANGUAGE ENGLISH   LEARNER PREFERENCE PRIMARY DEMONSTRATION   ANSWERED BY Patient   RELATIONSHIP SELF       Abuse Screening:  No flowsheet data found. Fall Risk  No flowsheet data found. Health Maintenance reviewed and discussed per provider. Yes    Katia Desai is due for influenza. Please order/place referral if appropriate. Advance Directive:  1. Do you have an advance directive in place? Patient Reply: no    2. If not, would you like material regarding how to put one in place? Patient Reply: no    Coordination of Care:  1. Have you been to the ER, urgent care clinic since your last visit? Hospitalized since your last visit? no    2. Have you seen or consulted any other health care providers outside of the [address] [place] [address] since your last visit? Include any pap smears or colon screening.  no

## 2017-08-28 NOTE — MR AVS SNAPSHOT
Visit Information Date & Time Provider Department Dept. Phone Encounter #  
 8/28/2017  4:15 PM Magda Landin NP [address] [phone] [id number] Your Appointments 8/31/2017  1:15 PM  
ESTABLISHED PATIENT with Julio Cesar Mccann MD  
[facility] ([address] [facility]) Appt Note: surg fu/stent [address] [place] [address]  
[phone]  
  
   
 [address] Upcoming Health Maintenance Date Due INFLUENZA AGE 9 TO ADULT 8/1/2017 PAP AKA CERVICAL CYTOLOGY 12/22/2017* DTaP/Tdap/Td series (2 - Td) 6/26/2024 *Topic was postponed. The date shown is not the original due date. Allergies as of 8/28/2017  Review Complete On: 8/28/2017 By: Gabrielle Wiley LPN Severity Noted Reaction Type Reactions Ceftin [Cefuroxime Axetil]  05/09/2017    Nausea and Vomiting She can tolerate ceftriaxone. Current Immunizations  Reviewed on 7/25/2016 Name Date Tdap 6/26/2014 Not reviewed this visit You Were Diagnosed With   
  
 Codes Comments Essential hypertension    -  Primary ICD-10-CM: I10 
ICD-9-CM: 401.9 Vitals BP Pulse Temp Resp Height(growth percentile) Weight(growth percentile) (!) 148/96 (BP 1 Location: Right arm, BP Patient Position: Sitting) 82 97.4 °F (36.3 °C) (Oral) 18 5' 7\" (1.702 m) 212 lb (96.2 kg) LMP SpO2 BMI OB Status Smoking Status 08/23/2017 100% 33.2 kg/m2 Having regular periods Former Smoker Vitals History BMI and BSA Data Body Mass Index Body Surface Area  
 33.2 kg/m 2 2.13 m 2 Preferred Pharmacy Pharmacy Name Phone [facility] [address], [address] [address] [facility] [phone] Your Updated Medication List  
  
   
This list is accurate as of: 8/28/17  4:40 PM.  Always use your most recent med list.  
  
  
  
  
 * amLODIPine 5 mg tablet Commonly known as:  Dewey Matar Take 1 Tab by mouth daily. * amLODIPine 2.5 mg tablet Commonly known as:  Dewey Matar Take 1 Tab by mouth daily for 90 days. citalopram 40 mg tablet Commonly known as:  Judythe Grade Take 1 Tab by mouth daily. glycopyrrolate 1 mg tablet Commonly known as:  ROBINUL  
1 mg daily as needed. hydroCHLOROthiazide 25 mg tablet Commonly known as:  HYDRODIURIL Take 1 Tab by mouth daily. LEXAPRO 20 mg tablet Generic drug:  escitalopram oxalate Take 20 mg by mouth daily. OTHER Indications: unspecified birth control pill.  
  
 phentermine 37.5 mg tablet Commonly known as:  ADIPEX-P Take 1 Tab by mouth every morning. Max Daily Amount: 37.5 mg. WELLBUTRIN  mg SR tablet Generic drug:  buPROPion SR Take  by mouth two (2) times a day. * Notice: This list has 2 medication(s) that are the same as other medications prescribed for you. Read the directions carefully, and ask your doctor or other care provider to review them with you. Prescriptions Sent to Pharmacy Refills  
 amLODIPine (NORVASC) 2.5 mg tablet 2 Sig: Take 1 Tab by mouth daily for 90 days. Class: Normal  
 Pharmacy: [facility] [address], [address] [facility] #: [phone] Route: Oral  
  
Introducing [facility] & HEALTH SERVICES! Robin Mao introduces [employer] patient portal. Now you can access parts of your medical record, email your doctor's office, and request medication refills online. 1. In your internet browser, go to [email or web address] 2. Click on the First Time User? Click Here link in the Sign In box. You will see the New Member Sign Up page. 3. Enter your [employer] Access Code exactly as it appears below. You will not need to use this code after youve completed the sign-up process. If you do not sign up before the expiration date, you must request a new code. · [employer] Access Code: [email or web address] Expires: 11/26/2017  4:40 PM 
 
4. Enter the last four digits of your Social Security Number (xxxx) and Date of Birth (mm/dd/yyyy) as indicated and click Submit. You will be taken to the next sign-up page. 5. Create a [employer] ID. This will be your [employer] login ID and cannot be changed, so think of one that is secure and easy to remember. 6. Create a [employer] password. You can change your password at any time. 7. Enter your Password Reset Question and Answer. This can be used at a later time if you forget your password. 8. Enter your e-mail address. You will receive e-mail notification when new information is available in [address] 9. Click Sign Up. You can now view and download portions of your medical record. 10. Click the Download Summary menu link to download a portable copy of your medical information. If you have questions, please visit the Frequently Asked Questions section of the [employer] website. Remember, [employer] is NOT to be used for urgent needs. For medical emergencies, dial 911. Now available from your iPhone and Android! Please provide this summary of care documentation to your next provider. Your primary care clinician is listed as Roland Stephens. If you have any questions after today's visit, please call [phone].

## 2017-08-28 NOTE — LETTER
NOTIFICATION RETURN TO WORK / SCHOOL 
 
8/28/2017 4:40 PM 
 
Ms. Deedee Lee [address] [facility] [address] [phone] To Whom It May Concern: 
 
Deedee Lee is currently under the care of Shayla De León. She will return to work/school on: 08/29/2017. If there are questions or concerns please have the patient contact our office. Sincerely, Robert Fuller NP

## 2017-08-28 NOTE — PROGRESS NOTES
HISTORY OF PRESENT ILLNESS  Ryley Calvert is a 39 y.o. female. Hypertension    The history is provided by the patient. This is a chronic problem. The problem has not changed since onset. Associated symptoms include anxiety and headaches. Pertinent negatives include no chest pain, no orthopnea, no palpitations, no PND, no malaise/fatigue, no blurred vision, no peripheral edema, no dizziness, no shortness of breath, no nausea and no vomiting. There are no associated agents to hypertension. Review of Systems   Constitutional: Negative for chills, fever and malaise/fatigue. HENT: Negative for congestion. Eyes: Negative for blurred vision and double vision. Respiratory: Negative for cough, sputum production and shortness of breath. Cardiovascular: Negative for chest pain, palpitations, orthopnea, claudication, leg swelling and PND. Gastrointestinal: Negative for abdominal pain, heartburn, nausea and vomiting. Genitourinary: Negative for dysuria, flank pain, frequency, hematuria and urgency. Musculoskeletal: Negative for myalgias. Neurological: Positive for headaches. Negative for dizziness. Psychiatric/Behavioral: Positive for depression. Negative for memory loss, substance abuse and suicidal ideas. The patient is nervous/anxious and has insomnia. Physical Exam   Constitutional: She is oriented to person, place, and time. She appears well-developed. No distress. HENT:   Head: Normocephalic and atraumatic. Eyes: EOM are normal. Pupils are equal, round, and reactive to light. Neck: Neck supple. Cardiovascular: Regular rhythm. Pulmonary/Chest: Breath sounds normal. No respiratory distress. She has no wheezes. Lymphadenopathy:     She has no cervical adenopathy. Neurological: She is alert and oriented to person, place, and time. Skin: Skin is dry. She is not diaphoretic. No erythema. Psychiatric: She has a normal mood and affect.    Nursing note and vitals reviewed. ASSESSMENT and PLAN    ICD-10-CM ICD-9-CM    1. Essential hypertension I10 401.9 amLODIPine (NORVASC) 2.5 mg tablet   medication is increased for hypertension and advised to record BP. Report the changes to PCP. Follow up psych for anxiety and depression.

## 2017-08-31 ENCOUNTER — TELEPHONE (OUTPATIENT)
Dept: INTERNAL MEDICINE CLINIC | Age: 37
End: 2017-08-31

## 2017-08-31 DIAGNOSIS — B96.89 BV (BACTERIAL VAGINOSIS): Primary | ICD-10-CM

## 2017-08-31 DIAGNOSIS — N76.0 BV (BACTERIAL VAGINOSIS): Primary | ICD-10-CM

## 2017-09-06 ENCOUNTER — TELEPHONE (OUTPATIENT)
Dept: INTERNAL MEDICINE CLINIC | Age: 37
End: 2017-09-06

## 2017-09-06 RX ORDER — METRONIDAZOLE 7.5 MG/G
1 GEL VAGINAL
Qty: 187.5 MG | Refills: 0 | Status: SHIPPED | OUTPATIENT
Start: 2017-09-06 | End: 2018-07-12 | Stop reason: CLARIF

## 2017-09-06 NOTE — TELEPHONE ENCOUNTER
2 pt. Identifiers confirmed. S/W pt. To let her knwo that her [facility] paperwork was faxed yesterday, confirmation received. Pt. Does c/o some vaginal odor and whitish-grey discharge, s/s of BV.

## 2017-09-06 NOTE — TELEPHONE ENCOUNTER
Pt called inquiring about her FMLA. Pt said she was told 1 week ago by the nurse the paperwork was received. Pt would appreciate a return call follow up.

## 2018-02-06 ENCOUNTER — OFFICE VISIT (OUTPATIENT)
Dept: INTERNAL MEDICINE CLINIC | Age: 38
End: 2018-02-06

## 2018-02-06 VITALS
TEMPERATURE: 97.2 F | WEIGHT: 215.8 LBS | DIASTOLIC BLOOD PRESSURE: 80 MMHG | RESPIRATION RATE: 16 BRPM | HEIGHT: 67 IN | OXYGEN SATURATION: 99 % | BODY MASS INDEX: 33.87 KG/M2 | SYSTOLIC BLOOD PRESSURE: 140 MMHG | HEART RATE: 83 BPM

## 2018-02-06 DIAGNOSIS — Z00.00 ROUTINE GENERAL MEDICAL EXAMINATION AT A HEALTH CARE FACILITY: Primary | ICD-10-CM

## 2018-02-06 DIAGNOSIS — I10 BENIGN HYPERTENSION WITHOUT CHF: ICD-10-CM

## 2018-02-06 DIAGNOSIS — F32.A DEPRESSION, UNSPECIFIED DEPRESSION TYPE: ICD-10-CM

## 2018-02-06 DIAGNOSIS — F41.9 ANXIETY: ICD-10-CM

## 2018-02-06 DIAGNOSIS — E55.9 VITAMIN D DEFICIENCY: ICD-10-CM
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
## 2018-02-06 NOTE — PROGRESS NOTES
ROOM # 2    Jony Jacques presents today for   Chief Complaint   Patient presents with    Complete Physical       Jony Jacques preferred language for health care discussion is english/other. Is someone accompanying this pt? no    Is the patient using any DME equipment during OV? no    Depression Screening:  PHQ over the last two weeks 2/6/2018 5/23/2017 5/9/2017 4/20/2017 3/16/2017 7/25/2016 7/25/2016   Little interest or pleasure in doing things Not at all Several days Not at all Several days Several days Not at all Not at all   Feeling down, depressed or hopeless Not at all Several days Not at all Several days Several days Not at all Not at all   Total Score PHQ 2 0 2 0 2 2 0 0       Learning Assessment:  Learning Assessment 3/12/2015   PRIMARY LEARNER Patient   HIGHEST LEVEL OF EDUCATION - PRIMARY LEARNER  SOME COLLEGE   BARRIERS PRIMARY LEARNER NONE   PRIMARY LANGUAGE ENGLISH   LEARNER PREFERENCE PRIMARY DEMONSTRATION   ANSWERED BY Patient   RELATIONSHIP SELF       Abuse Screening:  No flowsheet data found. Fall Risk  No flowsheet data found. Health Maintenance reviewed and discussed per provider. Yes    Jony Jacques is due for pap smear (record request) . Please order/place referral if appropriate. Advance Directive:  1. Do you have an advance directive in place? Patient Reply: no    2. If not, would you like material regarding how to put one in place? Patient Reply: no    Coordination of Care:  1. Have you been to the ER, urgent care clinic since your last visit? Hospitalized since your last visit? no    2. Have you seen or consulted any other health care providers outside of the [address] [place] [address] since your last visit? Include any pap smears or colon screening.  no

## 2018-02-06 NOTE — MR AVS SNAPSHOT
[facility] 
 
 
 [facility] [address] [facility] [id number] 
[phone] Patient: Verlie Boas MRN: [id number] Visit Information Date & Time Provider Department Dept. Phone Encounter #  
 2/6/2018  9:30 AM Beth Mcghee MD [facility] [phone] [id number] Follow-up Instructions Return in about 3 months (around 5/6/2018) for f/u HTN. Upcoming Health Maintenance Date Due  
 PAP AKA CERVICAL CYTOLOGY 2/5/2021 DTaP/Tdap/Td series (2 - Td) 6/26/2024 Allergies as of 2/6/2018  Review Complete On: 2/6/2018 By: Beth Mcghee MD  
  
 Severity Noted Reaction Type Reactions Ceftin [Cefuroxime Axetil]  05/09/2017    Nausea and Vomiting She can tolerate ceftriaxone. Current Immunizations  Reviewed on 1/16/2018 Name Date Tdap 6/26/2014 12:00 AM  
  
 Not reviewed this visit You Were Diagnosed With   
  
 Codes Comments Routine general medical examination at a health care facility    -  Primary ICD-10-CM: Z00.00 ICD-9-CM: V70.0 Vitamin D deficiency     ICD-10-CM: E55.9 ICD-9-CM: 268.9 Vitals BP Pulse Temp Resp Height(growth percentile) Weight(growth percentile) (!) 133/98 (BP 1 Location: Left arm, BP Patient Position: Sitting) 83 97.2 °F (36.2 °C) (Oral) 16 5' 7\" (1.702 m) 215 lb 12.8 oz (97.9 kg) LMP SpO2 BMI OB Status Smoking Status 01/15/2018 99% 33.8 kg/m2 Having regular periods Former Smoker Vitals History BMI and BSA Data Body Mass Index Body Surface Area  
 33.8 kg/m 2 2.15 m 2 Preferred Pharmacy Pharmacy Name Phone [facility] [address], [address] [facility] [phone] Your Updated Medication List  
  
   
This list is accurate as of: 2/6/18  9:59 AM.  Always use your most recent med list. amLODIPine 5 mg tablet Commonly known as:  Virgen Cabrera Take 1 Tab by mouth daily. glycopyrrolate 1 mg tablet Commonly known as:  ROBINUL  
1 mg daily as needed. hydroCHLOROthiazide 25 mg tablet Commonly known as:  HYDRODIURIL Take 1 Tab by mouth daily. LEXAPRO 20 mg tablet Generic drug:  escitalopram oxalate Take 20 mg by mouth daily. OTHER Indications: unspecified birth control pill. WELLBUTRIN  mg SR tablet Generic drug:  buPROPion SR Take 150 mg by mouth daily. Follow-up Instructions Return in about 3 months (around 5/6/2018) for f/u HTN. To-Do List   
 02/06/2018 Lab:  CBC W/O DIFF   
  
 02/06/2018 Lab:  LIPID PANEL   
  
 02/06/2018 Lab:  METABOLIC PANEL, COMPREHENSIVE   
  
 02/06/2018 Lab:  TSH 3RD GENERATION   
  
 02/06/2018 Lab:  URINALYSIS W/ RFLX MICROSCOPIC   
  
 02/06/2018 Lab:  VITAMIN D, 25 HYDROXY Patient Instructions 1) follow-up in 3 months or sooner if worsening symptoms. Introducing [facility]! [employer] introduces [employer] patient portal. Now you can access parts of your medical record, email your doctor's office, and request medication refills online. 1. In your internet browser, go to [email or web address] 2. Click on the First Time User? Click Here link in the Sign In box. You will see the New Member Sign Up page. 3. Enter your [employer] Access Code exactly as it appears below. You will not need to use this code after youve completed the sign-up process. If you do not sign up before the expiration date, you must request a new code. · [employer] Access Code: [email or web address] Expires: 5/7/2018  9:33 AM 
 
4. Enter the last four digits of your Social Security Number (xxxx) and Date of Birth (mm/dd/yyyy) as indicated and click Submit. You will be taken to the next sign-up page. 5. Create a [employer] ID. This will be your [employer] login ID and cannot be changed, so think of one that is secure and easy to remember. 6. Create a Thirsty password. You can change your password at any time. 7. Enter your Password Reset Question and Answer. This can be used at a later time if you forget your password. 8. Enter your e-mail address. You will receive e-mail notification when new information is available in [address] 9. Click Sign Up. You can now view and download portions of your medical record. 10. Click the Download Summary menu link to download a portable copy of your medical information. If you have questions, please visit the Frequently Asked Questions section of the [employer] website. Remember, [employer] is NOT to be used for urgent needs. For medical emergencies, dial 911. Now available from your iPhone and Android! Please provide this summary of care documentation to your next provider. Your primary care clinician is listed as Roland Stephens. If you have any questions after today's visit, please call [phone].

## 2018-02-07 LAB
25(OH)D3 SERPL-MCNC: 22.9 NG/ML (ref 32–100)
A-G RATIO,AGRAT: 1.4 RATIO (ref 1.1–2.6)
ALBUMIN SERPL-MCNC: 4.1 G/DL (ref 3.5–5)
ALP SERPL-CCNC: 77 U/L (ref 25–115)
ALT SERPL-CCNC: 15 U/L (ref 5–40)
ANION GAP SERPL CALC-SCNC: 14 MMOL/L
AST SERPL W P-5'-P-CCNC: 14 U/L (ref 10–37)
BILIRUB SERPL-MCNC: 0.3 MG/DL (ref 0.2–1.2)
BILIRUB UR QL: NEGATIVE
BUN SERPL-MCNC: 14 MG/DL (ref 6–22)
CALCIUM SERPL-MCNC: 8.9 MG/DL (ref 8.4–10.5)
CHLORIDE SERPL-SCNC: 101 MMOL/L (ref 98–110)
CHOLEST SERPL-MCNC: 162 MG/DL (ref 110–200)
CO2 SERPL-SCNC: 26 MMOL/L (ref 20–32)
CREAT SERPL-MCNC: 0.6 MG/DL (ref 0.5–1.2)
EPITHELIAL,EPSU: ABNORMAL /HPF (ref 0–2)
ERYTHROCYTE [DISTWIDTH] IN BLOOD BY AUTOMATED COUNT: 15.6 % (ref 10–16)
GFRAA, 66117: >60
GFRNA, 66118: >60
GLOBULIN,GLOB: 2.9 G/DL (ref 2–4)
GLUCOSE SERPL-MCNC: 73 MG/DL (ref 70–99)
GLUCOSE UR QL: NEGATIVE MG/DL
HCT VFR BLD AUTO: 34.6 % (ref 35.1–46.5)
HDLC SERPL-MCNC: 2.1 MG/DL (ref 0–5)
HDLC SERPL-MCNC: 77 MG/DL (ref 40–59)
HGB BLD-MCNC: 10.4 G/DL (ref 11.7–15.5)
HGB UR QL STRIP: NEGATIVE
KETONES UR QL STRIP.AUTO: NEGATIVE MG/DL
LDLC SERPL CALC-MCNC: 68 MG/DL (ref 50–99)
LEUKOCYTE ESTERASE: NEGATIVE
MCH RBC QN AUTO: 28 PG (ref 26–34)
MCHC RBC AUTO-ENTMCNC: 30 G/DL (ref 32–36)
MCV RBC AUTO: 93 FL (ref 80–95)
NITRITE UR QL STRIP.AUTO: POSITIVE
PH UR STRIP: 6 PH (ref 5–8)
PLATELET # BLD AUTO: 292 K/UL (ref 140–440)
PMV BLD AUTO: 10.7 FL (ref 6–10.8)
POTASSIUM SERPL-SCNC: 3.9 MMOL/L (ref 3.5–5.5)
PROT SERPL-MCNC: 7 G/DL (ref 6.4–8.3)
PROT UR QL STRIP: NEGATIVE MG/DL
RBC # BLD AUTO: 3.73 M/UL (ref 3.8–5.2)
RBC #/AREA URNS HPF: ABNORMAL /HPF
SODIUM SERPL-SCNC: 141 MMOL/L (ref 133–145)
SP GR UR: 1.02 (ref 1–1.03)
TRIGL SERPL-MCNC: 85 MG/DL (ref 40–149)
TSH SERPL DL<=0.005 MIU/L-ACNC: 2.11 MCU/ML (ref 0.27–4.2)
UROBILINOGEN UR STRIP-MCNC: <2 MG/DL
VLDLC SERPL CALC-MCNC: 17 MG/DL (ref 8–30)
WBC # BLD AUTO: 4.5 K/UL (ref 4–11)
WBC URNS QL MICRO: ABNORMAL /HPF (ref 0–2)

## 2018-02-12 NOTE — PROGRESS NOTES
Please let pt know that labs were normal except:    1) urine positive for nitrites, but negative for everything else. Is she having any f/c/ns, dysuria, hematuria, increase urgency/frequency, abd pain, back pain? 2) vitamin D is low. Needs to start vitamin d3 2000 international units  Take one po daily OTC. 3) H/H little low at 10.4/34.6. Is she having any blood/black tarry stools, chest pain, shortness of breath?

## 2018-02-21 ENCOUNTER — TELEPHONE (OUTPATIENT)
Dept: INTERNAL MEDICINE CLINIC | Age: 38
End: 2018-02-21

## 2018-02-21 NOTE — PROGRESS NOTES
Attempted to contact pt at  number, no answer. Lvm for pt to return call to office at [phone]. Will continue to try to contact pt.

## 2018-02-21 NOTE — TELEPHONE ENCOUNTER
Attempted to contact pt at  number, no answer. m for pt to return call to office at [phone]. Will continue to try to contact pt.

## 2018-02-21 NOTE — TELEPHONE ENCOUNTER
----- Message from Liss Guerra MD sent at 2/12/2018 11:58 AM EST -----  Please let pt know that labs were normal except:    1) urine positive for nitrites, but negative for everything else. Is she having any f/c/ns, dysuria, hematuria, increase urgency/frequency, abd pain, back pain? 2) vitamin D is low. Needs to start vitamin d3 2000 international units  Take one po daily OTC. 3) H/H little low at 10.4/34.6. Is she having any blood/black tarry stools, chest pain, shortness of breath?

## 2018-02-22 NOTE — TELEPHONE ENCOUNTER
Called pt 2 pt identifiers confirmed informed pt of lab results pt stated understanding and denied all sx listed. Stated understanding no other questions or concerns noted at this time.

## 2018-05-01 ENCOUNTER — TELEPHONE (OUTPATIENT)
Dept: INTERNAL MEDICINE CLINIC | Age: 38
End: 2018-05-01

## 2018-05-01 NOTE — TELEPHONE ENCOUNTER
Patient would like to speak with Dr. Louie Smith nurse in reference to some paperwork that was sent over yesterday.

## 2018-05-01 NOTE — TELEPHONE ENCOUNTER
Contacted pt at [facility] number. Two patient Identifiers confirmed. She stated that the her employer sent over [facility] paperwork to be completed. Pt stated all her issues are still same and if contact is needed to call her at her cell [phone].

## 2018-05-11 NOTE — TELEPHONE ENCOUNTER
Contacted pt at [facility] number. Two patient Identifiers confirmed. Advised pt that paperwork was being handled by colleagues and that once paperwork was reviewed and completed send to [facility]. Pt verbalized understanding and stated if any other clarification was needed to contact her.

## 2018-05-21 NOTE — TELEPHONE ENCOUNTER
[employer] is calling in regards to paperwork received from office. They have additional questions. Request return phone call at [phone].

## 2018-06-11 ENCOUNTER — TELEPHONE (OUTPATIENT)
Dept: INTERNAL MEDICINE CLINIC | Age: 38
End: 2018-06-11

## 2018-06-11 NOTE — TELEPHONE ENCOUNTER
Sorry, but no. Additionally, FMLA is an ESTIMATE of time off, not an absolute. She needs to take this up with her HR people.

## 2018-06-11 NOTE — TELEPHONE ENCOUNTER
2 pt. Identifiers confirmed. Pt. Has FMLA approved by Dr. Yeni Garcia thru Aug 8 for anxiety and depression which allows her to miss 6 days per month and 1-1.5 days per week. Recently pt. Has had a couple absences outside of this. She has been missing approx 1-1.5hrs per day. Can another provider certify this for her? She does have [employer] coming up to which she may bring the paperwork but call can be quicker.  Just need t/c okay from clinical staff, due date July 1st.

## 2018-06-12 NOTE — TELEPHONE ENCOUNTER
2 pt. Identifiers confirmed. Pt. Notified of below. She states that she did actually review the original paperwork c HR and the issue has already been taken care of. She will f/u c Dr. Saunders Livers 7/2/18. No other questions/concerns at this time.

## 2018-07-05 ENCOUNTER — OFFICE VISIT (OUTPATIENT)
Dept: INTERNAL MEDICINE CLINIC | Age: 38
End: 2018-07-05

## 2018-07-05 VITALS
OXYGEN SATURATION: 100 % | BODY MASS INDEX: 33.62 KG/M2 | DIASTOLIC BLOOD PRESSURE: 89 MMHG | RESPIRATION RATE: 18 BRPM | HEART RATE: 90 BPM | HEIGHT: 67 IN | TEMPERATURE: 96.3 F | WEIGHT: 214.2 LBS | SYSTOLIC BLOOD PRESSURE: 136 MMHG

## 2018-07-05 DIAGNOSIS — E55.9 VITAMIN D DEFICIENCY: ICD-10-CM

## 2018-07-05 DIAGNOSIS — F41.9 ANXIETY AND DEPRESSION: ICD-10-CM

## 2018-07-05 DIAGNOSIS — F32.A ANXIETY AND DEPRESSION: ICD-10-CM

## 2018-07-05 DIAGNOSIS — I10 BENIGN HYPERTENSION WITHOUT CHF: Primary | ICD-10-CM

## 2018-07-05 DIAGNOSIS — Z76.0 MEDICATION REFILL: ICD-10-CM

## 2018-07-05 RX ORDER — METRONIDAZOLE 7.5 MG/G
GEL TOPICAL 2 TIMES DAILY
COMMUNITY
End: 2018-07-05 | Stop reason: SDUPTHER

## 2018-07-05 RX ORDER — AMLODIPINE BESYLATE 5 MG/1
5 TABLET ORAL DAILY
Qty: 90 TAB | Refills: 3 | Status: SHIPPED | OUTPATIENT
Start: 2018-07-05

## 2018-07-05 RX ORDER — CITALOPRAM 40 MG/1
40 TABLET, FILM COATED ORAL DAILY
COMMUNITY
End: 2019-03-27

## 2018-07-05 RX ORDER — METRONIDAZOLE 7.5 MG/G
GEL TOPICAL 2 TIMES DAILY
Qty: 60 G | Refills: 1 | Status: SHIPPED | OUTPATIENT
Start: 2018-07-05 | End: 2018-07-12 | Stop reason: CLARIF

## 2018-07-05 RX ORDER — HYDROCHLOROTHIAZIDE 25 MG/1
TABLET ORAL
Qty: 90 TAB | Refills: 3 | Status: SHIPPED | OUTPATIENT
Start: 2018-07-05 | End: 2019-07-29 | Stop reason: SDUPTHER

## 2018-07-05 NOTE — MR AVS SNAPSHOT
[address] [place] [address] 
 
 
 [address] [facility] [address] [address] 
[phone] Patient: Ryan Rdo MRN: [id number] Visit Information Date & Time Provider Department Dept. Phone Encounter #  
 7/5/2018  9:45 AM MD Jeremias [address] [facility] [phone] [id number] Follow-up Instructions Return in about 4 months (around 11/5/2018) for f/u HTN. Upcoming Health Maintenance Date Due Influenza Age 5 to Adult 8/1/2018 PAP AKA CERVICAL CYTOLOGY 2/5/2021 DTaP/Tdap/Td series (2 - Td) 6/26/2024 Allergies as of 7/5/2018  Review Complete On: 7/5/2018 By: Hanna Parra MD  
  
 Severity Noted Reaction Type Reactions Ceftin [Cefuroxime Axetil]  05/09/2017    Nausea and Vomiting She can tolerate ceftriaxone. Current Immunizations  Reviewed on 2/6/2018 Name Date Tdap 6/26/2014 12:00 AM  
  
 Not reviewed this visit You Were Diagnosed With   
  
 Codes Comments Benign hypertension without CHF    -  Primary ICD-10-CM: I10 
ICD-9-CM: 401.1 Medication refill     ICD-10-CM: Z76.0 ICD-9-CM: V68.1 Vitamin D deficiency     ICD-10-CM: E55.9 ICD-9-CM: 268.9 Vitals BP Pulse Temp Resp Height(growth percentile) Weight(growth percentile) 136/89 (BP 1 Location: Left arm, BP Patient Position: Sitting) 90 96.3 °F (35.7 °C) (Oral) 18 5' 7\" (1.702 m) 214 lb 3.2 oz (97.2 kg) LMP SpO2 BMI OB Status Smoking Status 07/03/2018 100% 33.55 kg/m2 Having regular periods Former Smoker Vitals History BMI and BSA Data Body Mass Index Body Surface Area  
 33.55 kg/m 2 2.14 m 2 Preferred Pharmacy Pharmacy Name Phone [facility] [address], [address] [facility] [phone] Your Updated Medication List  
  
   
This list is accurate as of 7/5/18 10:30 AM.  Always use your most recent med list. amLODIPine 5 mg tablet Commonly known as:  Herminia Maria Ines Take 1 Tab by mouth daily. citalopram 40 mg tablet Commonly known as:  Pipo Dame Take 40 mg by mouth daily. glycopyrrolate 1 mg tablet Commonly known as:  ROBINUL  
1 mg daily as needed. hydroCHLOROthiazide 25 mg tablet Commonly known as:  HYDRODIURIL  
TAKE ONE TABLET BY MOUTH DAILY LEXAPRO 20 mg tablet Generic drug:  escitalopram oxalate Take 10 mg by mouth daily. metroNIDAZOLE 0.75 % topical gel Commonly known as:  Michael Robinsons Apply  to affected area two (2) times a day. WELLBUTRIN  mg SR tablet Generic drug:  buPROPion SR Take 150 mg by mouth two (2) times a day. Prescriptions Sent to Pharmacy Refills  
 metroNIDAZOLE (METROGEL) 0.75 % topical gel 1 Sig: Apply  to affected area two (2) times a day. Class: Normal  
 Pharmacy: [address] Ph #: [phone] Route: Topical  
 amLODIPine (NORVASC) 5 mg tablet 3 Sig: Take 1 Tab by mouth daily. Class: Normal  
 Pharmacy: [address] Ph #: [phone] Route: Oral  
 hydroCHLOROthiazide (HYDRODIURIL) 25 mg tablet 3 Sig: TAKE ONE TABLET BY MOUTH DAILY Class: Normal  
 Pharmacy: [address] Ph #: [phone] Follow-up Instructions Return in about 4 months (around 11/5/2018) for f/u HTN. To-Do List   
 07/05/2018 Lab:  METABOLIC PANEL, BASIC   
  
 07/05/2018 Lab:  VITAMIN D, 25 HYDROXY Patient Instructions 1) follow-up in 4 months or sooner if worsening symptoms. Introducing [facility]! [employer] introduces [employer] patient portal. Now you can access parts of your medical record, email your doctor's office, and request medication refills online. 1. In your internet browser, go to [email or web address] 2. Click on the First Time User? Click Here link in the Sign In box. You will see the New Member Sign Up page. 3. Enter your [employer] Access Code exactly as it appears below. You will not need to use this code after youve completed the sign-up process. If you do not sign up before the expiration date, you must request a new code. · [employer] Access Code: [email or web address] Expires: 10/3/2018 10:30 AM 
 
4. Enter the last four digits of your Social Security Number (xxxx) and Date of Birth (mm/dd/yyyy) as indicated and click Submit. You will be taken to the next sign-up page. 5. Create a Mashable ID. This will be your M[employer]able login ID and cannot be changed, so think of one that is secure and easy to remember. 6. Create a Mashable password. You can change your password at any time. 7. Enter your Password Reset Question and Answer. This can be used at a later time if you forget your password. 8. Enter your e-mail address. You will receive e-mail notification when new information is available in [address] 9. Click Sign Up. You can now view and download portions of your medical record. 10. Click the Download Summary menu link to download a portable copy of your medical information. If you have questions, please visit the Frequently Asked Questions section of the Mashable website. Remember, [employer] is NOT to be used for urgent needs. For medical emergencies, dial 911. Now available from your iPhone and Android! Please provide this summary of care documentation to your next provider. Your primary care clinician is listed as Roland Stephens. If you have any questions after today's visit, please call [phone].
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
## 2018-07-05 NOTE — PROGRESS NOTES
ROOM # 2    Delphine Rodgers presents today for   Chief Complaint   Patient presents with    Hypertension     3 month f/u    Medication Refill    Form Completion     FMLA paperwork     Requested Prescriptions     Pending Prescriptions Disp Refills    metroNIDAZOLE (METROGEL) 0.75 % topical gel 60 g      Sig: Apply  to affected area two (2) times a day.  amLODIPine (NORVASC) 5 mg tablet 30 Tab 3     Sig: Take 1 Tab by mouth daily.  hydroCHLOROthiazide (HYDRODIURIL) 25 mg tablet 90 Tab 3       Loree Saxena preferred language for health care discussion is english/other. Is someone accompanying this pt? no    Is the patient using any DME equipment during OV? no    Depression Screening:  PHQ over the last two weeks 7/5/2018 7/5/2018 2/6/2018 5/23/2017 5/9/2017 4/20/2017 3/16/2017   Little interest or pleasure in doing things Not at all Not at all Not at all Several days Not at all Several days Several days   Feeling down, depressed or hopeless Not at all Not at all Not at all Several days Not at all Several days Several days   Total Score PHQ 2 0 0 0 2 0 2 2       Learning Assessment:  Learning Assessment 3/12/2015   PRIMARY LEARNER Patient   HIGHEST LEVEL OF EDUCATION - PRIMARY LEARNER  SOME COLLEGE   BARRIERS PRIMARY LEARNER NONE   PRIMARY LANGUAGE ENGLISH   LEARNER PREFERENCE PRIMARY DEMONSTRATION   ANSWERED BY Patient   RELATIONSHIP SELF       Abuse Screening:  Abuse Screening Questionnaire 7/5/2018   Do you ever feel afraid of your partner? N   Are you in a relationship with someone who physically or mentally threatens you? N   Is it safe for you to go home? Y       Fall Risk  No flowsheet data found. Health Maintenance reviewed and discussed per provider. Yes    Delphine Rodgers is due for There are no preventive care reminders to display for this patient. Please order/place referral if appropriate. Advance Directive:  1. Do you have an advance directive in place? Patient Reply: no    2.  If not, would you like material regarding how to put one in place? Patient Reply: no    Coordination of Care:  1. Have you been to the ER, urgent care clinic since your last visit? Hospitalized since your last visit? no    2. Have you seen or consulted any other health care providers outside of the [facility] since your last visit? Include any pap smears or colon screening.  no

## 2018-07-06 LAB
25(OH)D3 SERPL-MCNC: 36.3 NG/ML (ref 32–100)
ANION GAP SERPL CALC-SCNC: 16 MMOL/L
BUN SERPL-MCNC: 20 MG/DL (ref 6–22)
CALCIUM SERPL-MCNC: 9 MG/DL (ref 8.4–10.5)
CHLORIDE SERPL-SCNC: 101 MMOL/L (ref 98–110)
CO2 SERPL-SCNC: 28 MMOL/L (ref 20–32)
CREAT SERPL-MCNC: 0.7 MG/DL (ref 0.5–1.2)
GFRAA, 66117: >60
GFRNA, 66118: >60
GLUCOSE SERPL-MCNC: 74 MG/DL (ref 70–99)
POTASSIUM SERPL-SCNC: 3.7 MMOL/L (ref 3.5–5.5)
SODIUM SERPL-SCNC: 145 MMOL/L (ref 133–145)

## 2018-07-12 ENCOUNTER — TELEPHONE (OUTPATIENT)
Dept: INTERNAL MEDICINE CLINIC | Age: 38
End: 2018-07-12

## 2018-07-12 DIAGNOSIS — B96.89 BV (BACTERIAL VAGINOSIS): ICD-10-CM

## 2018-07-12 DIAGNOSIS — N76.0 BV (BACTERIAL VAGINOSIS): ICD-10-CM

## 2018-07-12 RX ORDER — METRONIDAZOLE 7.5 MG/G
1 GEL VAGINAL
Qty: 187.5 MG | Refills: 0 | Status: SHIPPED | OUTPATIENT
Start: 2018-07-12 | End: 2019-10-10 | Stop reason: SDUPTHER

## 2018-07-12 NOTE — TELEPHONE ENCOUNTER
Sent electronically:    Requested Prescriptions     Signed Prescriptions Disp Refills    metroNIDAZOLE (METROGEL) 0.75 % vaginal gel 187.5 mg 0     Sig: Insert 1 Applicator into vagina nightly for 5 days.      Authorizing Provider: Jose R Truong

## 2018-07-12 NOTE — TELEPHONE ENCOUNTER
Sagar Giordano from [employer] is calling in regards to  metroNIDAZOLE (METROGEL) 0.75 % topical gel.  Sagar Giordano states the patient said that she is suppose to get a vaginal gel not topical.

## 2018-09-21 ENCOUNTER — TELEPHONE (OUTPATIENT)
Dept: INTERNAL MEDICINE CLINIC | Age: 38
End: 2018-09-21

## 2018-09-21 NOTE — TELEPHONE ENCOUNTER
Patient calling c/o infrequent dizziness throughout the day. Unable to contact nurse. Patient has made an appointment for Monday at 7:45am. Request return call.

## 2018-09-21 NOTE — TELEPHONE ENCOUNTER
Attempted to contact pt at  number, no answer. Lvm for pt to return call to office at [phone]. Will continue to try to contact pt.

## 2018-09-24 ENCOUNTER — OFFICE VISIT (OUTPATIENT)
Dept: INTERNAL MEDICINE CLINIC | Age: 38
End: 2018-09-24

## 2018-09-24 VITALS
HEART RATE: 87 BPM | SYSTOLIC BLOOD PRESSURE: 125 MMHG | TEMPERATURE: 97.2 F | WEIGHT: 214.8 LBS | HEIGHT: 67 IN | RESPIRATION RATE: 17 BRPM | OXYGEN SATURATION: 100 % | DIASTOLIC BLOOD PRESSURE: 83 MMHG | BODY MASS INDEX: 33.71 KG/M2

## 2018-09-24 DIAGNOSIS — R42 DIZZINESS: Primary | ICD-10-CM

## 2018-09-24 RX ORDER — DEXTROAMPHETAMINE SACCHARATE, AMPHETAMINE ASPARTATE, DEXTROAMPHETAMINE SULFATE AND AMPHETAMINE SULFATE 5; 5; 5; 5 MG/1; MG/1; MG/1; MG/1
20 TABLET ORAL DAILY
COMMUNITY
End: 2019-03-27

## 2018-09-24 RX ORDER — DEXTROAMPHETAMINE SACCHARATE, AMPHETAMINE ASPARTATE MONOHYDRATE, DEXTROAMPHETAMINE SULFATE AND AMPHETAMINE SULFATE 6.25; 6.25; 6.25; 6.25 MG/1; MG/1; MG/1; MG/1
25 CAPSULE, EXTENDED RELEASE ORAL
COMMUNITY
End: 2019-06-06 | Stop reason: SDUPTHER
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
## 2018-09-24 NOTE — PROGRESS NOTES
ROOM # 2 Harrison Barry presents today for Chief Complaint Patient presents with  Dizziness  
  in the evening or while at work Harrison Barry preferred language for health care discussion is english/other. Is someone accompanying this pt? no 
 
Is the patient using any DME equipment during OV? no 
 
Depression Screening: PHQ over the last two weeks 9/24/2018 7/5/2018 7/5/2018 2/6/2018 5/23/2017 5/9/2017 4/20/2017 Little interest or pleasure in doing things Several days Not at all Not at all Not at all Several days Not at all Several days Feeling down, depressed, irritable, or hopeless Several days Not at all Not at all Not at all Several days Not at all Several days Total Score PHQ 2 2 0 0 0 2 0 2 Learning Assessment: 
Learning Assessment 3/12/2015 PRIMARY LEARNER Patient HIGHEST LEVEL OF EDUCATION - PRIMARY LEARNER  SOME COLLEGE  
BARRIERS PRIMARY LEARNER NONE PRIMARY LANGUAGE ENGLISH  
LEARNER PREFERENCE PRIMARY DEMONSTRATION  
ANSWERED BY Patient RELATIONSHIP SELF Abuse Screening: 
Abuse Screening Questionnaire 7/5/2018 Do you ever feel afraid of your partner? Catherine Distel Are you in a relationship with someone who physically or mentally threatens you? Catherine Distel Is it safe for you to go home? Julio Roe Fall Risk No flowsheet data found. Health Maintenance reviewed and discussed per provider. Yes Harrison Barry is due for There are no preventive care reminders to display for this patient. Please order/place referral if appropriate. Advance Directive: 1. Do you have an advance directive in place? Patient Reply: no 
 
2. If not, would you like material regarding how to put one in place? Patient Reply: no 
 
Coordination of Care: 1. Have you been to the ER, urgent care clinic since your last visit? Hospitalized since your last visit? no 
 
2.  Have you seen or consulted any other health care providers outside of the [facility] since your last visit? Include any pap smears or colon screening.  no

## 2018-09-24 NOTE — PATIENT INSTRUCTIONS
1) follow-up in 3 months or sooner if worsening symptoms. 2) Take food with your medications and water. 3) cancel 11/5/18 OV.

## 2018-09-24 NOTE — MR AVS SNAPSHOT
[address] [facility] 
 
 
 [address] [id number] 
[phone] Patient: Ning Cordon MRN: [id number] AZP:[id number] Visit Information Date & Time Provider Department Dept. Phone Encounter #  
 9/24/2018  7:45 AM Clark Marinelli MD [facility] [phone] [id number] Follow-up Instructions Return in about 2 months (around 12/5/2018) for f/u HTN. Your Appointments 11/5/2018  9:45 AM  
Office Visit with Paola Hernandez MD  
[facility]) Appt Note: 4 month f/u HTN  
 [address]  
  
   
 [address] [facility] Upcoming Health Maintenance Date Due Influenza Age 5 to Adult 10/1/2018* PAP AKA CERVICAL CYTOLOGY 2/5/2021 DTaP/Tdap/Td series (2 - Td) 6/26/2024 *Topic was postponed. The date shown is not the original due date. Allergies as of 9/24/2018  Review Complete On: 9/24/2018 By: Paola Hernandez MD  
  
 Severity Noted Reaction Type Reactions Ceftin [Cefuroxime Axetil]  05/09/2017    Nausea and Vomiting She can tolerate ceftriaxone. Current Immunizations  Reviewed on 2/6/2018 Name Date Tdap 6/26/2014 12:00 AM  
  
 Not reviewed this visit You Were Diagnosed With   
  
 Codes Comments Dizziness    -  Primary ICD-10-CM: B86 ICD-9-CM: 780.4 Vitals BP Pulse Temp Resp Height(growth percentile) Weight(growth percentile) 125/83 (BP 1 Location: Right arm, BP Patient Position: Sitting) 87 97.2 °F (36.2 °C) (Oral) 17 5' 7\" (1.702 m) 214 lb 12.8 oz (97.4 kg) LMP SpO2 BMI OB Status Smoking Status 09/03/2018 100% 33.64 kg/m2 Having regular periods Former Smoker Vitals History BMI and BSA Data Body Mass Index Body Surface Area  
 33.64 kg/m 2 2.15 m 2 Preferred Pharmacy Pharmacy Name Phone [facility] , [phone] [facility] [address] [facility] [phone] Your Updated Medication List  
  
   
This list is accurate as of 9/24/18  8:38 AM.  Always use your most recent med list.  
  
  
  
  
 * ADDERALL 20 mg tablet Generic drug:  dextroamphetamine-amphetamine Take 20 mg by mouth daily. * ADDERALL XR 25 mg XR capsule Generic drug:  amphetamine-dextroamphetamine XR Take 25 mg by mouth every morning. amLODIPine 5 mg tablet Commonly known as:  [place] Take 1 Tab by mouth daily. citalopram 40 mg tablet Commonly known as:  Laura Eye Take 40 mg by mouth daily. glycopyrrolate 1 mg tablet Commonly known as:  ROBINUL  
1 mg daily as needed. hydroCHLOROthiazide 25 mg tablet Commonly known as:  HYDRODIURIL  
TAKE ONE TABLET BY MOUTH DAILY WELLBUTRIN  mg SR tablet Generic drug:  buPROPion SR Take 150 mg by mouth two (2) times a day. * Notice: This list has 2 medication(s) that are the same as other medications prescribed for you. Read the directions carefully, and ask your doctor or other care provider to review them with you. Follow-up Instructions Return in about 2 months (around 12/5/2018) for f/u HTN. To-Do List   
 10/24/2018 Lab:  CBC W/O DIFF   
  
 10/24/2018 Lab:  METABOLIC PANEL, COMPREHENSIVE   
  
 10/24/2018 Lab:  TSH 3RD GENERATION Patient Instructions 1) follow-up in 3 months or sooner if worsening symptoms. 2) Take food with your medications and water. 3) cancel 11/5/18 OV. Introducing [facility]! Romayne Duster introduces [employer] patient portal. Now you can access parts of your medical record, email your doctor's office, and request medication refills online. 1. In your internet browser, go to [email or web address] 2. Click on the First Time User? Click Here link in the Sign In box. You will see the New Member Sign Up page. 3. Enter your [employer] Access Code exactly as it appears below. You will not need to use this code after youve completed the sign-up process. If you do not sign up before the expiration date, you must request a new code. · [employer] Access Code: [email or web address] Expires: 10/3/2018 10:30 AM 
 
4. Enter the last four digits of your Social Security Number (xxxx) and Date of Birth (mm/dd/yyyy) as indicated and click Submit. You will be taken to the next sign-up page. 5. Create a [employer] ID. This will be your [employer] login ID and cannot be changed, so think of one that is secure and easy to remember. 6. Create a [employer] password. You can change your password at any time. 7. Enter your Password Reset Question and Answer. This can be used at a later time if you forget your password. 8. Enter your e-mail address. You will receive e-mail notification when new information is available in [address] 9. Click Sign Up. You can now view and download portions of your medical record. 10. Click the Download Summary menu link to download a portable copy of your medical information. If you have questions, please visit the Frequently Asked Questions section of the [employer] website. Remember, [employer] is NOT to be used for urgent needs. For medical emergencies, dial 911. Now available from your iPhone and Android! Please provide this summary of care documentation to your next provider. Your primary care clinician is listed as Roland Stephens. If you have any questions after today's visit, please call [phone].

## 2018-09-27 ENCOUNTER — TELEPHONE (OUTPATIENT)
Dept: INTERNAL MEDICINE CLINIC | Age: 38
End: 2018-09-27

## 2018-09-27 ENCOUNTER — LAB ONLY (OUTPATIENT)
Dept: INTERNAL MEDICINE CLINIC | Age: 38
End: 2018-09-27

## 2018-09-27 DIAGNOSIS — E87.6 HYPOKALEMIA: Primary | ICD-10-CM

## 2018-09-27 NOTE — TELEPHONE ENCOUNTER
Received forms in front. Called patient and informed her they have been faxed. I asked patient if she would like a copy she stated \"no, she will receive a compy from them\".

## 2018-09-28 LAB
A-G RATIO,AGRAT: 1.4 RATIO (ref 1.1–2.6)
ALBUMIN SERPL-MCNC: 4.3 G/DL (ref 3.5–5)
ALP SERPL-CCNC: 91 U/L (ref 25–115)
ALT SERPL-CCNC: 9 U/L (ref 5–40)
ANION GAP SERPL CALC-SCNC: 19 MMOL/L
AST SERPL W P-5'-P-CCNC: 14 U/L (ref 10–37)
BILIRUB SERPL-MCNC: 0.2 MG/DL (ref 0.2–1.2)
BUN SERPL-MCNC: 12 MG/DL (ref 6–22)
CALCIUM SERPL-MCNC: 9.8 MG/DL (ref 8.4–10.5)
CHLORIDE SERPL-SCNC: 98 MMOL/L (ref 98–110)
CO2 SERPL-SCNC: 24 MMOL/L (ref 20–32)
CREAT SERPL-MCNC: 0.7 MG/DL (ref 0.5–1.2)
ERYTHROCYTE [DISTWIDTH] IN BLOOD BY AUTOMATED COUNT: 18.5 % (ref 10–15.5)
GFRAA, 66117: >60
GFRNA, 66118: >60
GLOBULIN,GLOB: 3.1 G/DL (ref 2–4)
GLUCOSE SERPL-MCNC: 90 MG/DL (ref 70–99)
HCT VFR BLD AUTO: 36.1 % (ref 35.1–46.5)
HGB BLD-MCNC: 10.7 G/DL (ref 11.7–15.5)
MCH RBC QN AUTO: 27 PG (ref 26–34)
MCHC RBC AUTO-ENTMCNC: 30 G/DL (ref 31–36)
MCV RBC AUTO: 91 FL (ref 80–95)
PLATELET # BLD AUTO: 344 K/UL (ref 140–440)
PMV BLD AUTO: 11.4 FL (ref 9–13)
POTASSIUM SERPL-SCNC: 3.4 MMOL/L (ref 3.5–5.5)
PROT SERPL-MCNC: 7.4 G/DL (ref 6.4–8.3)
RBC # BLD AUTO: 3.95 M/UL (ref 3.8–5.2)
SODIUM SERPL-SCNC: 141 MMOL/L (ref 133–145)
TSH SERPL DL<=0.005 MIU/L-ACNC: 1.74 MCU/ML (ref 0.27–4.2)
WBC # BLD AUTO: 6.3 K/UL (ref 4–11)

## 2018-10-01 ENCOUNTER — TELEPHONE (OUTPATIENT)
Dept: INTERNAL MEDICINE CLINIC | Age: 38
End: 2018-10-01

## 2018-10-01 NOTE — PROGRESS NOTES
Please let pt know that labs were normal except: 
 
1) potassium low at 3.4. Increase high potassium foods. Will recheck levels in 1 week. 2) hemoglobin low at 10.7. This is similar from 7 months ago when it was 10.4. Any chest pain, shortness of breath, blood or black tarry stools?

## 2018-10-01 NOTE — TELEPHONE ENCOUNTER
Spoke with patient and 2 patient identifiers was confirmed. Patient was given results below and verbalized understanding . Patient has no questions/concerns  at this time. Pt denies any chest pain, sob, blood or black tarry stools.

## 2018-10-01 NOTE — TELEPHONE ENCOUNTER
----- Message from Paco Rosa MD sent at 10/1/2018  7:08 AM EDT -----  Please let pt know that labs were normal except:    1) potassium low at 3.4. Increase high potassium foods. Will recheck levels in 1 week. 2) hemoglobin low at 10.7. This is similar from 7 months ago when it was 10.4. Any chest pain, shortness of breath, blood or black tarry stools?

## 2018-10-08 DIAGNOSIS — E87.6 HYPOKALEMIA: ICD-10-CM

## 2019-02-23 NOTE — MR AVS SNAPSHOT
Visit Information Date & Time Provider Department Dept. Phone Encounter #  
 4/20/2017  3:00 PM Ruiz Lopez MD [facility] [phone] [id number] Follow-up Instructions Return in about 1 month (around 5/20/2017) for f/u depression, f/u anxiety. Upcoming Health Maintenance Date Due  
 PAP AKA CERVICAL CYTOLOGY 10/10/2016 DTaP/Tdap/Td series (2 - Td) 6/26/2024 Allergies as of 4/20/2017  Review Complete On: 4/20/2017 By: Ruiz Lopez MD  
 No Known Allergies Current Immunizations  Reviewed on 7/25/2016 Name Date Tdap 6/26/2014 Not reviewed this visit You Were Diagnosed With   
  
 Codes Comments Anxiety and depression    -  Primary ICD-10-CM: F41.9, F32.9 ICD-9-CM: 300.00, 311 Urinary tract infection without hematuria, site unspecified     ICD-10-CM: N39.0 ICD-9-CM: 599.0 Foul smelling urine     ICD-10-CM: R82.90 ICD-9-CM: 791.9 Vitals BP Pulse Temp Resp Height(growth percentile) Weight(growth percentile) (!) 132/93 (BP 1 Location: Left arm, BP Patient Position: Sitting) (!) 112 98.2 °F (36.8 °C) (Oral) 16 5' 7\" (1.702 m) 214 lb 3.2 oz (97.2 kg) LMP SpO2 BMI OB Status Smoking Status 04/06/2017 99% 33.55 kg/m2 Having regular periods Former Smoker Vitals History BMI and BSA Data Body Mass Index Body Surface Area  
 33.55 kg/m 2 2.14 m 2 Preferred Pharmacy Pharmacy Name Phone [facility] [address], [address] [facility] [phone] Your Updated Medication List  
  
   
This list is accurate as of: 4/20/17  3:42 PM.  Always use your most recent med list.  
  
  
  
  
 citalopram 40 mg tablet Commonly known as:  Mertha Slim Take 1 Tab by mouth daily. glycopyrrolate 1 mg tablet Commonly known as:  ROBINUL  
1 mg daily as needed. hydroCHLOROthiazide 25 mg tablet Commonly known as:  HYDRODIURIL Take 1 Tab by mouth daily. metroNIDAZOLE 500 mg tablet Commonly known as:  FLAGYL Take 500 mg by mouth.  
  
 phentermine 37.5 mg tablet Commonly known as:  ADIPEX-P Take 1 Tab by mouth every morning. Max Daily Amount: 37.5 mg.  
  
 trimethoprim-sulfamethoxazole 160-800 mg per tablet Commonly known as:  BACTRIM DS, SEPTRA DS Take 1 Tab by mouth two (2) times a day for 3 days. Prescriptions Sent to Pharmacy Refills  
 citalopram (CELEXA) 40 mg tablet 3 Sig: Take 1 Tab by mouth daily. Class: Normal  
 Pharmacy: [address] Ph #: [phone] Route: Oral  
 trimethoprim-sulfamethoxazole (BACTRIM DS, SEPTRA DS) 160-800 mg per tablet 0 Sig: Take 1 Tab by mouth two (2) times a day for 3 days. Class: Normal  
 Pharmacy: [address] Ph #: [phone] Route: Oral  
  
We Performed the Following AMB POC URINALYSIS DIP STICK AUTO W/O MICRO [91925 CPT(R)] CULTURE, URINE [id number] CPT(R)] Follow-up Instructions Return in about 1 month (around 5/20/2017) for f/u depression, f/u anxiety. To-Do List   
 04/20/2017 Microbiology:  CULTURE, URINE Patient Instructions 1) increase the celexa to 40mg daily (take 20mg 2 po daily) 2) increase water 3) follow-up in 1 month or sooner if worsening symptoms. 4) sent new rx for celexa 40mg one po daily. Urinary Tract Infection in Women: Care Instructions Your Care Instructions A urinary tract infection, or UTI, is a general term for an infection anywhere between the kidneys and the urethra (where urine comes out). Most UTIs are bladder infections. They often cause pain or burning when you urinate. UTIs are caused by bacteria and can be cured with antibiotics. Be sure to complete your treatment so that the infection goes away. Follow-up care is a key part of your treatment and safety.  Be sure to make and go to all appointments, and call your doctor if you are having problems. It's also a good idea to know your test results and keep a list of the medicines you take. How can you care for yourself at home? · Take your antibiotics as directed. Do not stop taking them just because you feel better. You need to take the full course of antibiotics. · Drink extra water and other fluids for the next day or two. This may help wash out the bacteria that are causing the infection. (If you have kidney, heart, or liver disease and have to limit fluids, talk with your doctor before you increase your fluid intake.) · Avoid drinks that are carbonated or have caffeine. They can irritate the bladder. · Urinate often. Try to empty your bladder each time. · To relieve pain, take a hot bath or lay a heating pad set on low over your lower belly or genital area. Never go to sleep with a heating pad in place. To prevent UTIs · Drink plenty of water each day. This helps you urinate often, which clears bacteria from your system. (If you have kidney, heart, or liver disease and have to limit fluids, talk with your doctor before you increase your fluid intake.) · Urinate when you need to. · Urinate right after you have sex. · Change sanitary pads often. · Avoid douches, bubble baths, feminine hygiene sprays, and other feminine hygiene products that have deodorants. · After going to the bathroom, wipe from front to back. When should you call for help? Call your doctor now or seek immediate medical care if: · Symptoms such as fever, chills, nausea, or vomiting get worse or appear for the first time. · You have new pain in your back just below your rib cage. This is called flank pain. · There is new blood or pus in your urine. · You have any problems with your antibiotic medicine. Watch closely for changes in your health, and be sure to contact your doctor if: · You are not getting better after taking an antibiotic for 2 days. · Your symptoms go away but then come back. Where can you learn more? Go to http://jacky-mayra.info/. Enter P271 in the search box to learn more about \"Urinary Tract Infection in Women: Care Instructions. \" Current as of: November 28, 2016 Content Version: 11.2 © 0164-8650 [employer]. Care instructions adapted under license by [employer] (which disclaims liability or warranty for this information). If you have questions about a medical condition or this instruction, always ask your healthcare professional. [place] [address] any warranty or liability for your use of this information. Introducing [facility]! Manny Powell introduces [employer] patient portal. Now you can access parts of your medical record, email your doctor's office, and request medication refills online. 1. In your internet browser, go to [email or web address] 2. Click on the First Time User? Click Here link in the Sign In box. You will see the New Member Sign Up page. 3. Enter your [employer] Access Code exactly as it appears below. You will not need to use this code after youve completed the sign-up process. If you do not sign up before the expiration date, you must request a new code. · [employer] Access Code: [email or web address] Expires: 6/14/2017  7:44 AM 
 
4. Enter the last four digits of your Social Security Number (xxxx) and Date of Birth (mm/dd/yyyy) as indicated and click Submit. You will be taken to the next sign-up page. 5. Create a [employer] ID. This will be your [employer] login ID and cannot be changed, so think of one that is secure and easy to remember. 6. Create a [employer] password. You can change your password at any time. 7. Enter your Password Reset Question and Answer. This can be used at a later time if you forget your password. 8. Enter your e-mail address. You will receive e-mail notification when new information is available in [address] 9. Click Sign Up. You can now view and download portions of your medical record. 10. Click the Download Summary menu link to download a portable copy of your medical information. If you have questions, please visit the Frequently Asked Questions section of the [employer] website. Remember, [employer] is NOT to be used for urgent needs. For medical emergencies, dial 911. Now available from your iPhone and Android! Please provide this summary of care documentation to your next provider. Your primary care clinician is listed as Karen Moise. If you have any questions after today's visit, please call [phone]. DISCHARGE

## 2019-03-27 ENCOUNTER — OFFICE VISIT (OUTPATIENT)
Dept: INTERNAL MEDICINE CLINIC | Age: 39
End: 2019-03-27

## 2019-03-27 VITALS
OXYGEN SATURATION: 100 % | RESPIRATION RATE: 18 BRPM | HEART RATE: 79 BPM | TEMPERATURE: 97.2 F | SYSTOLIC BLOOD PRESSURE: 140 MMHG | DIASTOLIC BLOOD PRESSURE: 80 MMHG | HEIGHT: 67 IN | WEIGHT: 212.8 LBS | BODY MASS INDEX: 33.4 KG/M2

## 2019-03-27 DIAGNOSIS — F32.A ANXIETY AND DEPRESSION: ICD-10-CM

## 2019-03-27 DIAGNOSIS — I10 BENIGN HYPERTENSION WITHOUT CHF: Primary | ICD-10-CM

## 2019-03-27 DIAGNOSIS — F41.9 ANXIETY AND DEPRESSION: ICD-10-CM

## 2019-03-27 DIAGNOSIS — F90.9 ATTENTION DEFICIT HYPERACTIVITY DISORDER (ADHD), UNSPECIFIED ADHD TYPE: ICD-10-CM

## 2019-03-27 DIAGNOSIS — B96.89 BV (BACTERIAL VAGINOSIS): ICD-10-CM

## 2019-03-27 DIAGNOSIS — N76.0 BV (BACTERIAL VAGINOSIS): ICD-10-CM

## 2019-03-27 RX ORDER — METRONIDAZOLE 500 MG/1
500 TABLET ORAL 2 TIMES DAILY
Qty: 14 TAB | Refills: 0 | Status: SHIPPED | OUTPATIENT
Start: 2019-03-27 | End: 2019-07-10 | Stop reason: SDUPTHER

## 2019-03-27 RX ORDER — ESCITALOPRAM OXALATE 20 MG/1
20 TABLET ORAL DAILY
COMMUNITY
End: 2019-03-27 | Stop reason: SDUPTHER

## 2019-03-27 RX ORDER — ESCITALOPRAM OXALATE 20 MG/1
20 TABLET ORAL DAILY
Qty: 90 TAB | Refills: 0 | Status: SHIPPED | OUTPATIENT
Start: 2019-03-27 | End: 2019-10-10 | Stop reason: SDUPTHER
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
## 2019-03-27 NOTE — PROGRESS NOTES
ROOM [facility] [id number] presents today for   Chief Complaint   Patient presents with    Hypertension     f/u    Anxiety     f/u    Medication Refill    Referral Follow Up     needs new referral to psych       Lakshmi Cheng preferred language for health care discussion is english/other. Is someone accompanying this pt? no    Is the patient using any DME equipment during OV? no    Depression Screening:  3 most recent [facility] Screens 9/24/2018 7/5/2018 7/5/2018 2/6/2018 5/23/2017 5/9/2017 4/20/2017   Little interest or pleasure in doing things Several days Not at all Not at all Not at all Several days Not at all Several days   Feeling down, depressed, irritable, or hopeless Several days Not at all Not at all Not at all Several days Not at all Several days   Total Score PHQ 2 2 0 0 0 2 0 2       Learning Assessment:  Learning Assessment 3/12/2015   PRIMARY LEARNER Patient   HIGHEST LEVEL OF EDUCATION - PRIMARY LEARNER  SOME COLLEGE   BARRIERS PRIMARY LEARNER NONE   PRIMARY LANGUAGE ENGLISH   LEARNER PREFERENCE PRIMARY DEMONSTRATION   ANSWERED BY Patient   RELATIONSHIP SELF       Abuse Screening:  Abuse Screening Questionnaire 7/5/2018   Do you ever feel afraid of your partner? N   Are you in a relationship with someone who physically or mentally threatens you? N   Is it safe for you to go home? Y       Fall Risk  No flowsheet data found. Health Maintenance reviewed and discussed per provider. Yes    Lakshmi Cheng is due for   There are no preventive care reminders to display for this patient. Please order/place referral if appropriate. Advance Directive:  1. Do you have an advance directive in place? Patient Reply: no    2. If not, would you like material regarding how to put one in place? Patient Reply: no    Coordination of Care:  1. Have you been to the ER, urgent care clinic since your last visit? Hospitalized since your last visit? no    2.  Have you seen or consulted any other health care providers outside of the [address] [place] [address] since your last visit? Include any pap smears or colon screening.  no

## 2019-04-27 DIAGNOSIS — I10 BENIGN HYPERTENSION WITHOUT CHF: ICD-10-CM

## 2019-06-03 ENCOUNTER — TELEPHONE (OUTPATIENT)
Dept: INTERNAL MEDICINE CLINIC | Age: 39
End: 2019-06-03

## 2019-06-03 DIAGNOSIS — F90.9 ATTENTION DEFICIT HYPERACTIVITY DISORDER (ADHD), UNSPECIFIED ADHD TYPE: Primary | ICD-10-CM

## 2019-06-03 NOTE — TELEPHONE ENCOUNTER
Patient requesting refill on Adderall can not get in with Psych for another month or so please advise

## 2019-06-03 NOTE — TELEPHONE ENCOUNTER
Is psychiatry not going to give her a refill and are they okay with PCP refilling this? I have not prescribed this for her.

## 2019-06-06 RX ORDER — DEXTROAMPHETAMINE SACCHARATE, AMPHETAMINE ASPARTATE MONOHYDRATE, DEXTROAMPHETAMINE SULFATE AND AMPHETAMINE SULFATE 6.25; 6.25; 6.25; 6.25 MG/1; MG/1; MG/1; MG/1
CAPSULE, EXTENDED RELEASE ORAL
Qty: 60 CAP | Refills: 0 | Status: SHIPPED | OUTPATIENT
Start: 2019-06-06 | End: 2019-06-13 | Stop reason: SDUPTHER

## 2019-06-06 NOTE — TELEPHONE ENCOUNTER
Spoke with patient and she was d/c from the psychiatry office due to a missed appt. Patient has a new psych , but the appt is July 3. Patient is requesting a script of Adderall 25mg XL she takes it bid.

## 2019-06-06 NOTE — TELEPHONE ENCOUNTER
Checked  and no aberrancies seen. Printed rx for:    Requested Prescriptions     Signed Prescriptions Disp Refills    amphetamine-dextroamphetamine XR (ADDERALL XR) 25 mg XR capsule 60 Cap 0     Sig: Take one po bid     Authorizing Provider: Geovani Casas     Please let pt know that this is ready for .

## 2019-06-07 ENCOUNTER — LAB ONLY (OUTPATIENT)
Dept: INTERNAL MEDICINE CLINIC | Age: 39
End: 2019-06-07

## 2019-06-07 DIAGNOSIS — D64.9 ANEMIA, UNSPECIFIED TYPE: Primary | ICD-10-CM

## 2019-06-08 LAB
A-G RATIO,AGRAT: 1.3 RATIO (ref 1.1–2.6)
ALBUMIN SERPL-MCNC: 4.1 G/DL (ref 3.5–5)
ALP SERPL-CCNC: 90 U/L (ref 25–115)
ALT SERPL-CCNC: 12 U/L (ref 5–40)
ANION GAP SERPL CALC-SCNC: 14 MMOL/L
AST SERPL W P-5'-P-CCNC: 13 U/L (ref 10–37)
BILIRUB SERPL-MCNC: 0.2 MG/DL (ref 0.2–1.2)
BUN SERPL-MCNC: 13 MG/DL (ref 6–22)
CALCIUM SERPL-MCNC: 9 MG/DL (ref 8.4–10.5)
CHLORIDE SERPL-SCNC: 104 MMOL/L (ref 98–110)
CHOLEST SERPL-MCNC: 172 MG/DL (ref 110–200)
CO2 SERPL-SCNC: 23 MMOL/L (ref 20–32)
CREAT SERPL-MCNC: 0.7 MG/DL (ref 0.5–1.2)
ERYTHROCYTE [DISTWIDTH] IN BLOOD BY AUTOMATED COUNT: 18.4 % (ref 10–15.5)
GFRAA, 66117: >60
GFRNA, 66118: >60
GLOBULIN,GLOB: 3.2 G/DL (ref 2–4)
GLUCOSE SERPL-MCNC: 85 MG/DL (ref 70–99)
HCT VFR BLD AUTO: 30.3 % (ref 35.1–46.5)
HDLC SERPL-MCNC: 2.4 MG/DL (ref 0–5)
HDLC SERPL-MCNC: 71 MG/DL (ref 40–59)
HGB BLD-MCNC: 8.7 G/DL (ref 11.7–15.5)
LDLC SERPL CALC-MCNC: 84 MG/DL (ref 50–99)
MCH RBC QN AUTO: 26 PG (ref 26–34)
MCHC RBC AUTO-ENTMCNC: 29 G/DL (ref 31–36)
MCV RBC AUTO: 89 FL (ref 80–95)
PLATELET # BLD AUTO: 336 K/UL (ref 140–440)
PMV BLD AUTO: 11.3 FL (ref 9–13)
POTASSIUM SERPL-SCNC: 3.9 MMOL/L (ref 3.5–5.5)
PROT SERPL-MCNC: 7.3 G/DL (ref 6.4–8.3)
RBC # BLD AUTO: 3.39 M/UL (ref 3.8–5.2)
SODIUM SERPL-SCNC: 141 MMOL/L (ref 133–145)
TRIGL SERPL-MCNC: 82 MG/DL (ref 40–149)
VLDLC SERPL CALC-MCNC: 16 MG/DL (ref 8–30)
WBC # BLD AUTO: 5.2 K/UL (ref 4–11)

## 2019-06-09 NOTE — PROGRESS NOTES
Please let pt know that labs were normal except:    1) H/H low at 8.7/30.3. Was pt on her menses when she got lab work? If not, we will need to do additional lab work.

## 2019-06-12 ENCOUNTER — TELEPHONE (OUTPATIENT)
Dept: INTERNAL MEDICINE CLINIC | Age: 39
End: 2019-06-12

## 2019-06-12 DIAGNOSIS — F90.9 ATTENTION DEFICIT HYPERACTIVITY DISORDER (ADHD), UNSPECIFIED ADHD TYPE: ICD-10-CM

## 2019-06-12 NOTE — TELEPHONE ENCOUNTER
Please be advised. PA was attempted for Adderall XR 25 mg tablet and received message that PA is not required for this medication. Pharm contacted to verify. Pharm states that patient plan will only allow for Adderall XR 25 mg one capsule per day. Please see patient message below.

## 2019-06-12 NOTE — TELEPHONE ENCOUNTER
Patient is calling back stating the Ins will not fill the Adderall XR Rx for 60 days. States she normally get an Adderal XR 25mg #30, and an Adderal 20mg #30 Rx.

## 2019-06-12 NOTE — TELEPHONE ENCOUNTER
Prior auth request received for patient medication Adderall XR 25 mg.  Prior auth completed via cover my meds. Message states that PA is not required at this time.

## 2019-06-13 RX ORDER — DEXTROAMPHETAMINE SACCHARATE, AMPHETAMINE ASPARTATE MONOHYDRATE, DEXTROAMPHETAMINE SULFATE AND AMPHETAMINE SULFATE 6.25; 6.25; 6.25; 6.25 MG/1; MG/1; MG/1; MG/1
CAPSULE, EXTENDED RELEASE ORAL
Qty: 30 CAP | Refills: 0 | Status: SHIPPED | OUTPATIENT
Start: 2019-06-13 | End: 2019-10-03 | Stop reason: SDUPTHER

## 2019-06-13 RX ORDER — DEXTROAMPHETAMINE SACCHARATE, AMPHETAMINE ASPARTATE, DEXTROAMPHETAMINE SULFATE AND AMPHETAMINE SULFATE 5; 5; 5; 5 MG/1; MG/1; MG/1; MG/1
20 TABLET ORAL DAILY
Qty: 30 TAB | Refills: 0 | Status: SHIPPED | OUTPATIENT
Start: 2019-06-13 | End: 2019-10-03 | Stop reason: SDUPTHER

## 2019-06-13 NOTE — TELEPHONE ENCOUNTER
She told us she does the adderall XR 25mg fermín po bid. Checked  and no aberrancies seen. Printed rx for:    Requested Prescriptions     Signed Prescriptions Disp Refills    amphetamine-dextroamphetamine XR (ADDERALL XR) 25 mg XR capsule 30 Cap 0     Sig: Take one po daily     Authorizing Provider: ELIF SANDOVAL    dextroamphetamine-amphetamine (ADDERALL) 20 mg tablet 30 Tab 0     Sig: Take 1 Tab by mouth daily. Max Daily Amount: 20 mg. Authorizing Provider: Geovani Casas    The pharmacy should disregard the previous adderall rx of 25mg one po bid.

## 2019-07-02 NOTE — PROGRESS NOTES
Attempted to contact pt at  number, no answer. m for pt to return call to office at [phone]. Will continue to try to contact pt.

## 2019-07-09 ENCOUNTER — TELEPHONE (OUTPATIENT)
Dept: INTERNAL MEDICINE CLINIC | Age: 39
End: 2019-07-09

## 2019-07-09 DIAGNOSIS — N76.0 BV (BACTERIAL VAGINOSIS): ICD-10-CM

## 2019-07-09 DIAGNOSIS — B96.89 BV (BACTERIAL VAGINOSIS): ICD-10-CM

## 2019-07-10 RX ORDER — METRONIDAZOLE 500 MG/1
500 TABLET ORAL 2 TIMES DAILY
Qty: 14 TAB | Refills: 0 | Status: SHIPPED | OUTPATIENT
Start: 2019-07-10 | End: 2019-07-17

## 2019-07-10 NOTE — TELEPHONE ENCOUNTER
Sent electronically:    Requested Prescriptions     Signed Prescriptions Disp Refills    metroNIDAZOLE (FLAGYL) 500 mg tablet 14 Tab 0     Sig: Take 1 Tab by mouth two (2) times a day for 7 days. Authorizing Provider: Ravin LUNSFORD.

## 2019-07-11 NOTE — PROGRESS NOTES
Attempted to contact patient at  number, no answer. Left message for patient that she will need to come into office for lab work. If she has any questions she can call office at [phone].

## 2019-07-15 ENCOUNTER — OFFICE VISIT (OUTPATIENT)
Dept: INTERNAL MEDICINE CLINIC | Age: 39
End: 2019-07-15

## 2019-07-15 VITALS
TEMPERATURE: 99 F | HEART RATE: 82 BPM | WEIGHT: 212 LBS | HEIGHT: 67 IN | RESPIRATION RATE: 17 BRPM | SYSTOLIC BLOOD PRESSURE: 129 MMHG | OXYGEN SATURATION: 98 % | DIASTOLIC BLOOD PRESSURE: 82 MMHG | BODY MASS INDEX: 33.27 KG/M2

## 2019-07-15 DIAGNOSIS — D50.9 IRON DEFICIENCY ANEMIA, UNSPECIFIED IRON DEFICIENCY ANEMIA TYPE: Primary | ICD-10-CM

## 2019-07-15 DIAGNOSIS — I10 BENIGN HYPERTENSION WITHOUT CHF: ICD-10-CM

## 2019-07-15 DIAGNOSIS — R63.5 WEIGHT GAIN: ICD-10-CM

## 2019-07-15 DIAGNOSIS — R35.89 POLYURIA: ICD-10-CM
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
## 2019-07-15 NOTE — PROGRESS NOTES
ROOM # 1  Identified pt with two pt identifiers(name and ). Reviewed record in preparation for visit and have obtained necessary documentation. Chief Complaint   Patient presents with    Hypertension     f/u      Satish Kearney preferred language for health care discussion is english/other. Is the patient using any DME equipment during OV? NO    Satish Kearney is due for: There are no preventive care reminders to display for this patient. Health Maintenance reviewed and discussed per provider  Please order/place referral if appropriate. Advance Directive:  1. Do you have an advance directive in place? Patient Reply: NO    2. If not, would you like material regarding how to put one in place? NO    Coordination of Care:  1. Have you been to the ER, urgent care clinic since your last visit? Hospitalized since your last visit? NO    2. Have you seen or consulted any other health care providers outside of the [address] [place] [address] since your last visit? Include any pap smears or colon screening. NO    Patient is accompanied by self I have received verbal consent from Satish Kearney to discuss any/all medical information while they are present in the room.     Learning Assessment:  Learning Assessment 3/12/2015   PRIMARY LEARNER Patient   HIGHEST LEVEL OF EDUCATION - PRIMARY LEARNER  SOME COLLEGE   BARRIERS PRIMARY LEARNER NONE   PRIMARY LANGUAGE ENGLISH   LEARNER PREFERENCE PRIMARY DEMONSTRATION   ANSWERED BY Patient   RELATIONSHIP SELF     Depression Screening:  3 most recent [facility] Screens 7/15/2019 2018 2018 2018 2018 2017 2017   Little interest or pleasure in doing things Several days Several days Not at all Not at all Not at all Several days Not at all   Feeling down, depressed, irritable, or hopeless Several days Several days Not at all Not at all Not at all Several days Not at all   Total Score PHQ 2 2 2 0 0 0 2 0     Abuse Screening:  Abuse Screening Questionnaire 2018 Do you ever feel afraid of your partner? N   Are you in a relationship with someone who physically or mentally threatens you? N   Is it safe for you to go home?  Y     Fall Risk  n/i

## 2019-07-16 LAB
ANISOCYTOSIS: ABNORMAL
AVG GLU, 10930: 106 MG/DL (ref 91–123)
ERYTHROCYTE [DISTWIDTH] IN BLOOD BY AUTOMATED COUNT: 20.4 % (ref 10–15.5)
FE % SATURATION,PSAT: 7 % (ref 20–50)
FERRITIN SERPL-MCNC: 19 NG/ML (ref 10–291)
FOLATE,FOL: 9.18 NG/ML
HBA1C MFR BLD HPLC: 5.3 % (ref 4.8–5.9)
HCT VFR BLD AUTO: 29 % (ref 35.1–46.5)
HGB BLD-MCNC: 8.5 G/DL (ref 11.7–15.5)
HYPOCHROMIA, 12007: ABNORMAL
IRON,IRN: 33 MCG/DL (ref 30–160)
MCH RBC QN AUTO: 26 PG (ref 26–34)
MCHC RBC AUTO-ENTMCNC: 29 G/DL (ref 31–36)
MCV RBC AUTO: 88 FL (ref 80–95)
OVALOCYTES, 1119: ABNORMAL
PLATELET # BLD AUTO: 273 K/UL (ref 140–440)
PMV BLD AUTO: 11.3 FL (ref 9–13)
RBC # BLD AUTO: 3.3 M/UL (ref 3.8–5.2)
SMEAR EVAL, 1131: ABNORMAL
TIBC,TIBC: 448 MCG/DL (ref 228–428)
TRANSFERRIN,TRAF: 368 MG/DL (ref 200–400)
TSH SERPL DL<=0.005 MIU/L-ACNC: 2.03 MCU/ML (ref 0.27–4.2)
UIBC SERPL-MCNC: 415 MCG/DL (ref 110–370)
VIT B12 SERPL-MCNC: 467 PG/ML (ref 211–911)
WBC # BLD AUTO: 7.1 K/UL (ref 4–11)

## 2019-07-23 DIAGNOSIS — D50.9 IRON DEFICIENCY ANEMIA, UNSPECIFIED IRON DEFICIENCY ANEMIA TYPE: Primary | ICD-10-CM

## 2019-07-23 RX ORDER — LANOLIN ALCOHOL/MO/W.PET/CERES
325 CREAM (GRAM) TOPICAL
Qty: 30 TAB | Refills: 3 | Status: SHIPPED | OUTPATIENT
Start: 2019-07-23

## 2019-07-23 NOTE — PROGRESS NOTES
Pt returned call. Pt was advised of results and that script was sent to pharmacy for Fe. Pt verbalized understanding and had no further questions or concerns.

## 2019-07-23 NOTE — PROGRESS NOTES
Please let pt know that labs were normal except:    1) H/H low at 8.5/29 and 1 month ago was about the same 8.7/30.3. 2) Fe level okay, but Fe saturation low. She needs to start taking Fe sulfate 325mg one po daily and will recheck CBC and Fe levels in 3 weeks.

## 2019-07-23 NOTE — PROGRESS NOTES
Attempted to contact pt at  number, no answer. Lvm for pt to return call to office at [phone] . Will continue to try to contact pt.

## 2019-08-01 DIAGNOSIS — D64.9 ANEMIA, UNSPECIFIED TYPE: ICD-10-CM

## 2019-10-03 DIAGNOSIS — F90.9 ATTENTION DEFICIT HYPERACTIVITY DISORDER (ADHD), UNSPECIFIED ADHD TYPE: ICD-10-CM

## 2019-10-03 RX ORDER — DEXTROAMPHETAMINE SACCHARATE, AMPHETAMINE ASPARTATE MONOHYDRATE, DEXTROAMPHETAMINE SULFATE AND AMPHETAMINE SULFATE 6.25; 6.25; 6.25; 6.25 MG/1; MG/1; MG/1; MG/1
CAPSULE, EXTENDED RELEASE ORAL
Qty: 30 CAP | Refills: 0 | Status: SHIPPED | OUTPATIENT
Start: 2019-10-03

## 2019-10-03 RX ORDER — DEXTROAMPHETAMINE SACCHARATE, AMPHETAMINE ASPARTATE, DEXTROAMPHETAMINE SULFATE AND AMPHETAMINE SULFATE 5; 5; 5; 5 MG/1; MG/1; MG/1; MG/1
20 TABLET ORAL DAILY
Qty: 30 TAB | Refills: 0 | Status: SHIPPED | OUTPATIENT
Start: 2019-10-03

## 2019-10-03 NOTE — TELEPHONE ENCOUNTER
Checked  and no aberrancies seen. Printed rx for:    Requested Prescriptions     Signed Prescriptions Disp Refills    amphetamine-dextroamphetamine XR (ADDERALL XR) 25 mg XR capsule 30 Cap 0     Sig: Take one po daily     Authorizing Provider: ELIF SANDOVAL    dextroamphetamine-amphetamine (ADDERALL) 20 mg tablet 30 Tab 0     Sig: Take 1 Tab by mouth daily. Max Daily Amount: 20 mg. Authorizing Provider: Angelica Prajapati     Please let pt know that this is ready for .

## 2019-10-03 NOTE — TELEPHONE ENCOUNTER
PCP: Ghislaine Pretty MD    Last appt: 7/15/2019  No future appointments. Requested Prescriptions     Pending Prescriptions Disp Refills    amphetamine-dextroamphetamine XR (ADDERALL XR) 25 mg XR capsule 30 Cap 0     Sig: Take one po daily    dextroamphetamine-amphetamine (ADDERALL) 20 mg tablet 30 Tab 0     Sig: Take 1 Tab by mouth daily. Max Daily Amount: 20 mg. Request for a 30 or 90 day supply? Provider Discretion    [facility]    Other Comments:   printed and placed in PCP medication refill review folder.      Last UDS date: none  Pain contract signed: none

## 2019-11-22 NOTE — TELEPHONE ENCOUNTER
Requested Prescriptions     Pending Prescriptions Disp Refills    buPROPion SR (WELLBUTRIN SR) 150 mg SR tablet       Sig: Take 1 Tab by mouth two (2) times a day.

## 2019-11-25 RX ORDER — BUPROPION HYDROCHLORIDE 150 MG/1
150 TABLET, EXTENDED RELEASE ORAL 2 TIMES DAILY
OUTPATIENT
Start: 2019-11-25

## 2019-12-09 RX ORDER — BUPROPION HYDROCHLORIDE 150 MG/1
150 TABLET, EXTENDED RELEASE ORAL 2 TIMES DAILY
Qty: 30 TAB | Refills: 2 | Status: SHIPPED | OUTPATIENT
Start: 2019-12-09 | End: 2020-01-14

## 2020-01-14 RX ORDER — BUPROPION HYDROCHLORIDE 150 MG/1
TABLET, EXTENDED RELEASE ORAL
Qty: 60 TAB | Refills: 1 | Status: SHIPPED | OUTPATIENT
Start: 2020-01-14

## 2021-08-28 NOTE — PROGRESS NOTES
Patient contacted at home number. 2 patient identifiers confirmed. Patient informed of below. Patient verbalized understanding. Patient states she was not on her menses when she had lab work done. Patient does report she her iron levels tend to run low. Patient scheduled for 3 month follow up Monday, July 15, 2019 02:45 PM for HTN. No other questions at this time. PAST MEDICAL HISTORY:  Renetta